# Patient Record
Sex: MALE | Race: WHITE | NOT HISPANIC OR LATINO | Employment: UNEMPLOYED | URBAN - METROPOLITAN AREA
[De-identification: names, ages, dates, MRNs, and addresses within clinical notes are randomized per-mention and may not be internally consistent; named-entity substitution may affect disease eponyms.]

---

## 2021-01-06 ENCOUNTER — TELEPHONE (OUTPATIENT)
Dept: SCHEDULING | Facility: IMAGING CENTER | Age: 61
End: 2021-01-06

## 2021-02-09 ENCOUNTER — TELEMEDICINE (OUTPATIENT)
Dept: MEDICAL GROUP | Facility: PHYSICIAN GROUP | Age: 61
End: 2021-02-09
Payer: COMMERCIAL

## 2021-02-09 VITALS — WEIGHT: 190 LBS | HEIGHT: 71 IN | BODY MASS INDEX: 26.6 KG/M2

## 2021-02-09 DIAGNOSIS — R53.83 FATIGUE, UNSPECIFIED TYPE: ICD-10-CM

## 2021-02-09 DIAGNOSIS — Z11.59 ENCOUNTER FOR HEPATITIS C SCREENING TEST FOR LOW RISK PATIENT: ICD-10-CM

## 2021-02-09 DIAGNOSIS — E78.5 DYSLIPIDEMIA: ICD-10-CM

## 2021-02-09 DIAGNOSIS — J45.20 MILD INTERMITTENT ASTHMA WITHOUT COMPLICATION: ICD-10-CM

## 2021-02-09 DIAGNOSIS — E11.9 CONTROLLED TYPE 2 DIABETES MELLITUS WITHOUT COMPLICATION, WITHOUT LONG-TERM CURRENT USE OF INSULIN (HCC): ICD-10-CM

## 2021-02-09 PROCEDURE — 99214 OFFICE O/P EST MOD 30 MIN: CPT | Performed by: FAMILY MEDICINE

## 2021-02-09 SDOH — HEALTH STABILITY: MENTAL HEALTH: HOW OFTEN DO YOU HAVE A DRINK CONTAINING ALCOHOL?: 2-3 TIMES A WEEK

## 2021-02-09 SDOH — HEALTH STABILITY: MENTAL HEALTH: HOW MANY STANDARD DRINKS CONTAINING ALCOHOL DO YOU HAVE ON A TYPICAL DAY?: 1 OR 2

## 2021-02-09 SDOH — HEALTH STABILITY: MENTAL HEALTH: HOW OFTEN DO YOU HAVE 6 OR MORE DRINKS ON ONE OCCASION?: NEVER

## 2021-02-09 ASSESSMENT — PATIENT HEALTH QUESTIONNAIRE - PHQ9: CLINICAL INTERPRETATION OF PHQ2 SCORE: 0

## 2021-02-10 NOTE — ASSESSMENT & PLAN NOTE
Continues with annual retinal exams with no retinopathy.   Pt states last A1c was about 7   He declines statin  Repeat labs ordered.

## 2021-02-10 NOTE — ASSESSMENT & PLAN NOTE
Well controlled, rare use of his inhaler. No refill needed currently.   It made a big difference when he started swimming 3 days a week.

## 2021-02-11 NOTE — PROGRESS NOTES
Virtual Visit: New Patient   This visit was conducted via Zoom using secure and encrypted videoconferencing technology. The patient was in a private location in the state of [place].    The patient's identity was confirmed and verbal consent was obtained for this virtual visit.    Subjective:     CC:   Chief Complaint   Patient presents with   • [facility]       Sanjana Canchola is a 60 y.o. male presenting to establish care and to discuss the evaluation and management of:    Mild intermittent asthma without complication  Well controlled, rare use of his inhaler. No refill needed currently.   It made a big difference when he started swimming 3 days a week.       Controlled type 2 diabetes mellitus without complication, without long-term current use of insulin (HCC)  Continues with annual retinal exams with no retinopathy.   Pt states last A1c was about 7   He declines statin  Repeat labs ordered.       ROS  See HPI  Constitutional: Negative for fever, chills and malaise/fatigue.   HENT: Negative for congestion.    Eyes: Negative for pain.   Respiratory: Negative for cough and shortness of breath.    Cardiovascular: Negative for leg swelling.   Gastrointestinal: Negative for nausea, vomiting, abdominal pain and diarrhea.   Genitourinary: Negative for dysuria and hematuria.   Skin: Negative for rash.   Neurological: Negative for dizziness, focal weakness and headaches.   Endo/Heme/Allergies: Does not bruise/bleed easily.   Psychiatric/Behavioral: Negative for depression.  The patient is not nervous/anxious.      Allergies   Allergen Reactions   • Clindamycin      swelling       Current medicines (including changes today)  Current Outpatient Medications   Medication Sig Dispense Refill   • WIXELA INHUB 250-50 MCG/DOSE AEROSOL POWDER, BREATH ACTIVATED 1 Puff every day.     • metFORMIN (GLUCOPHAGE) 500 MG Tab Take 500 mg by mouth 3 times a day.       No current facility-administered medications for this visit.       He   "has no past medical history on file.  He  has no past surgical history on file.      History reviewed. No pertinent family history.  No family status information on file.       Patient Active Problem List    Diagnosis Date Noted   • Mild intermittent asthma without complication 02/09/2021   • Controlled type 2 diabetes mellitus without complication, without long-term current use of insulin (HCC) 02/09/2021          Objective:   Ht 1.803 m (5' 11\")   Wt 86.2 kg (190 lb)   BMI 26.50 kg/m²     Physical Exam:  Constitutional: Alert, no distress, well-groomed.  Skin: No rashes in visible areas.  Eye: Round. Conjunctiva clear, lids normal. No icterus.   ENMT: Lips pink without lesions, good dentition, moist mucous membranes. Phonation normal.  Neck: No masses, no thyromegaly. Moves freely without pain.  Respiratory: Unlabored respiratory effort, no cough or audible wheeze  Psych: Alert and oriented x3, normal affect and mood.       Assessment and Plan:   The following treatment plan was discussed:     1. Mild intermittent asthma without complication    2. Controlled type 2 diabetes mellitus without complication, without long-term current use of insulin (HCC)  - Comp Metabolic Panel; Future  - HEMOGLOBIN A1C; Future  - CBC WITH DIFFERENTIAL; Future  - Lipid Profile; Future    3. Encounter for hepatitis C screening test for low risk patient  - HEP C VIRUS ANTIBODY; Future    4. Fatigue, unspecified type  - CBC WITH DIFFERENTIAL; Future    5. Dyslipidemia  - Comp Metabolic Panel; Future  - Lipid Profile; Future    Other orders  - WIXELA INHUB 250-50 MCG/DOSE AEROSOL POWDER, BREATH ACTIVATED; 1 Puff every day.  - metFORMIN (GLUCOPHAGE) 500 MG Tab; Take 500 mg by mouth 3 times a day.        Follow-up: Return in about 6 months (around 8/9/2021) for DM2, review labs.         "

## 2021-02-18 ENCOUNTER — HOSPITAL ENCOUNTER (OUTPATIENT)
Dept: LAB | Facility: MEDICAL CENTER | Age: 61
End: 2021-02-18
Attending: FAMILY MEDICINE
Payer: COMMERCIAL

## 2021-02-18 DIAGNOSIS — E78.5 DYSLIPIDEMIA: ICD-10-CM

## 2021-02-18 DIAGNOSIS — E11.9 CONTROLLED TYPE 2 DIABETES MELLITUS WITHOUT COMPLICATION, WITHOUT LONG-TERM CURRENT USE OF INSULIN (HCC): ICD-10-CM

## 2021-02-18 DIAGNOSIS — R53.83 FATIGUE, UNSPECIFIED TYPE: ICD-10-CM

## 2021-02-18 DIAGNOSIS — Z11.59 ENCOUNTER FOR HEPATITIS C SCREENING TEST FOR LOW RISK PATIENT: ICD-10-CM

## 2021-02-18 LAB
ALBUMIN SERPL BCP-MCNC: 4.4 G/DL (ref 3.2–4.9)
ALBUMIN/GLOB SERPL: 1.5 G/DL
ALP SERPL-CCNC: 90 U/L (ref 30–99)
ALT SERPL-CCNC: 13 U/L (ref 2–50)
ANION GAP SERPL CALC-SCNC: 9 MMOL/L (ref 7–16)
AST SERPL-CCNC: 12 U/L (ref 12–45)
BASOPHILS # BLD AUTO: 0.7 % (ref 0–1.8)
BASOPHILS # BLD: 0.07 K/UL (ref 0–0.12)
BILIRUB SERPL-MCNC: 0.3 MG/DL (ref 0.1–1.5)
BUN SERPL-MCNC: 14 MG/DL (ref 8–22)
CALCIUM SERPL-MCNC: 10.1 MG/DL (ref 8.5–10.5)
CHLORIDE SERPL-SCNC: 95 MMOL/L (ref 96–112)
CHOLEST SERPL-MCNC: 183 MG/DL (ref 100–199)
CO2 SERPL-SCNC: 28 MMOL/L (ref 20–33)
CREAT SERPL-MCNC: 0.88 MG/DL (ref 0.5–1.4)
EOSINOPHIL # BLD AUTO: 0.68 K/UL (ref 0–0.51)
EOSINOPHIL NFR BLD: 6.7 % (ref 0–6.9)
ERYTHROCYTE [DISTWIDTH] IN BLOOD BY AUTOMATED COUNT: 43.9 FL (ref 35.9–50)
EST. AVERAGE GLUCOSE BLD GHB EST-MCNC: 157 MG/DL
FASTING STATUS PATIENT QL REPORTED: NORMAL
GLOBULIN SER CALC-MCNC: 2.9 G/DL (ref 1.9–3.5)
GLUCOSE SERPL-MCNC: 124 MG/DL (ref 65–99)
HBA1C MFR BLD: 7.1 % (ref 0–5.6)
HCT VFR BLD AUTO: 45.7 % (ref 42–52)
HCV AB SER QL: NORMAL
HDLC SERPL-MCNC: 49 MG/DL
HGB BLD-MCNC: 15.1 G/DL (ref 14–18)
IMM GRANULOCYTES # BLD AUTO: 0.02 K/UL (ref 0–0.11)
IMM GRANULOCYTES NFR BLD AUTO: 0.2 % (ref 0–0.9)
LDLC SERPL CALC-MCNC: 111 MG/DL
LYMPHOCYTES # BLD AUTO: 2.2 K/UL (ref 1–4.8)
LYMPHOCYTES NFR BLD: 21.8 % (ref 22–41)
MCH RBC QN AUTO: 29.4 PG (ref 27–33)
MCHC RBC AUTO-ENTMCNC: 33 G/DL (ref 33.7–35.3)
MCV RBC AUTO: 88.9 FL (ref 81.4–97.8)
MONOCYTES # BLD AUTO: 0.9 K/UL (ref 0–0.85)
MONOCYTES NFR BLD AUTO: 8.9 % (ref 0–13.4)
NEUTROPHILS # BLD AUTO: 6.24 K/UL (ref 1.82–7.42)
NEUTROPHILS NFR BLD: 61.7 % (ref 44–72)
NRBC # BLD AUTO: 0 K/UL
NRBC BLD-RTO: 0 /100 WBC
PLATELET # BLD AUTO: 362 K/UL (ref 164–446)
PMV BLD AUTO: 9.7 FL (ref 9–12.9)
POTASSIUM SERPL-SCNC: 4.7 MMOL/L (ref 3.6–5.5)
PROT SERPL-MCNC: 7.3 G/DL (ref 6–8.2)
RBC # BLD AUTO: 5.14 M/UL (ref 4.7–6.1)
SODIUM SERPL-SCNC: 132 MMOL/L (ref 135–145)
TRIGL SERPL-MCNC: 114 MG/DL (ref 0–149)
WBC # BLD AUTO: 10.1 K/UL (ref 4.8–10.8)

## 2021-02-18 PROCEDURE — 36415 COLL VENOUS BLD VENIPUNCTURE: CPT

## 2021-02-18 PROCEDURE — 80061 LIPID PANEL: CPT

## 2021-02-18 PROCEDURE — 83036 HEMOGLOBIN GLYCOSYLATED A1C: CPT

## 2021-02-18 PROCEDURE — 86803 HEPATITIS C AB TEST: CPT

## 2021-02-18 PROCEDURE — 85025 COMPLETE CBC W/AUTO DIFF WBC: CPT

## 2021-02-18 PROCEDURE — 80053 COMPREHEN METABOLIC PANEL: CPT

## 2021-02-20 ENCOUNTER — PATIENT MESSAGE (OUTPATIENT)
Dept: MEDICAL GROUP | Facility: PHYSICIAN GROUP | Age: 61
End: 2021-02-20

## 2021-02-22 NOTE — TELEPHONE ENCOUNTER
From: Sanjana Canchola  To: Physician Susana Miller  Sent: 2/20/2021 7:12 AM PST  Subject: Test Result Question    Was there not supposed to be an A1C test this most recent time? Anything involving blood glucose levels?

## 2021-05-10 ENCOUNTER — PATIENT MESSAGE (OUTPATIENT)
Dept: MEDICAL GROUP | Facility: PHYSICIAN GROUP | Age: 61
End: 2021-05-10

## 2021-06-08 ENCOUNTER — TELEPHONE (OUTPATIENT)
Dept: MEDICAL GROUP | Facility: PHYSICIAN GROUP | Age: 61
End: 2021-06-08

## 2022-02-09 ENCOUNTER — OFFICE VISIT (OUTPATIENT)
Dept: MEDICAL GROUP | Facility: PHYSICIAN GROUP | Age: 62
End: 2022-02-09
Payer: COMMERCIAL

## 2022-02-09 VITALS
HEART RATE: 77 BPM | TEMPERATURE: 97.6 F | SYSTOLIC BLOOD PRESSURE: 130 MMHG | BODY MASS INDEX: 28.15 KG/M2 | RESPIRATION RATE: 16 BRPM | OXYGEN SATURATION: 97 % | WEIGHT: 207.8 LBS | DIASTOLIC BLOOD PRESSURE: 82 MMHG | HEIGHT: 72 IN

## 2022-02-09 DIAGNOSIS — M79.674 PAIN OF RIGHT GREAT TOE: ICD-10-CM

## 2022-02-09 DIAGNOSIS — Z23 NEED FOR VACCINATION: ICD-10-CM

## 2022-02-09 DIAGNOSIS — Z12.11 SCREENING FOR COLON CANCER: ICD-10-CM

## 2022-02-09 DIAGNOSIS — E11.9 CONTROLLED TYPE 2 DIABETES MELLITUS WITHOUT COMPLICATION, WITHOUT LONG-TERM CURRENT USE OF INSULIN (HCC): ICD-10-CM

## 2022-02-09 PROCEDURE — 99214 OFFICE O/P EST MOD 30 MIN: CPT | Mod: 25 | Performed by: FAMILY MEDICINE

## 2022-02-09 PROCEDURE — 90686 IIV4 VACC NO PRSV 0.5 ML IM: CPT | Performed by: FAMILY MEDICINE

## 2022-02-09 PROCEDURE — 90471 IMMUNIZATION ADMIN: CPT | Performed by: FAMILY MEDICINE

## 2022-02-09 ASSESSMENT — FIBROSIS 4 INDEX: FIB4 SCORE: 0.55

## 2022-02-09 ASSESSMENT — PATIENT HEALTH QUESTIONNAIRE - PHQ9: CLINICAL INTERPRETATION OF PHQ2 SCORE: 0

## 2022-02-10 ENCOUNTER — HOSPITAL ENCOUNTER (OUTPATIENT)
Dept: LAB | Facility: MEDICAL CENTER | Age: 62
End: 2022-02-10
Attending: FAMILY MEDICINE
Payer: COMMERCIAL

## 2022-02-10 DIAGNOSIS — E11.9 CONTROLLED TYPE 2 DIABETES MELLITUS WITHOUT COMPLICATION, WITHOUT LONG-TERM CURRENT USE OF INSULIN (HCC): ICD-10-CM

## 2022-02-10 LAB
ALBUMIN SERPL BCP-MCNC: 4.6 G/DL (ref 3.2–4.9)
ALBUMIN/GLOB SERPL: 1.6 G/DL
ALP SERPL-CCNC: 74 U/L (ref 30–99)
ALT SERPL-CCNC: 15 U/L (ref 2–50)
ANION GAP SERPL CALC-SCNC: 10 MMOL/L (ref 7–16)
AST SERPL-CCNC: 15 U/L (ref 12–45)
BILIRUB SERPL-MCNC: 0.5 MG/DL (ref 0.1–1.5)
BUN SERPL-MCNC: 15 MG/DL (ref 8–22)
CALCIUM SERPL-MCNC: 9.7 MG/DL (ref 8.5–10.5)
CHLORIDE SERPL-SCNC: 101 MMOL/L (ref 96–112)
CHOLEST SERPL-MCNC: 197 MG/DL (ref 100–199)
CO2 SERPL-SCNC: 26 MMOL/L (ref 20–33)
CREAT SERPL-MCNC: 0.91 MG/DL (ref 0.5–1.4)
CREAT UR-MCNC: 109.81 MG/DL
FASTING STATUS PATIENT QL REPORTED: NORMAL
GLOBULIN SER CALC-MCNC: 2.9 G/DL (ref 1.9–3.5)
GLUCOSE SERPL-MCNC: 117 MG/DL (ref 65–99)
HDLC SERPL-MCNC: 46 MG/DL
LDLC SERPL CALC-MCNC: 125 MG/DL
MICROALBUMIN UR-MCNC: <1.2 MG/DL
MICROALBUMIN/CREAT UR: NORMAL MG/G (ref 0–30)
POTASSIUM SERPL-SCNC: 4.7 MMOL/L (ref 3.6–5.5)
PROT SERPL-MCNC: 7.5 G/DL (ref 6–8.2)
SODIUM SERPL-SCNC: 137 MMOL/L (ref 135–145)
TRIGL SERPL-MCNC: 128 MG/DL (ref 0–149)

## 2022-02-10 PROCEDURE — 83036 HEMOGLOBIN GLYCOSYLATED A1C: CPT

## 2022-02-10 PROCEDURE — 82570 ASSAY OF URINE CREATININE: CPT

## 2022-02-10 PROCEDURE — 82043 UR ALBUMIN QUANTITATIVE: CPT

## 2022-02-10 PROCEDURE — 36415 COLL VENOUS BLD VENIPUNCTURE: CPT

## 2022-02-10 PROCEDURE — 80053 COMPREHEN METABOLIC PANEL: CPT

## 2022-02-10 PROCEDURE — 80061 LIPID PANEL: CPT

## 2022-02-10 NOTE — ASSESSMENT & PLAN NOTE
Redness, pain, swelling of right great toe very severe 3 days ago.   Patient notes improvement of pain and swelling after epsom salt soaks, he has a healing blister on the bottom of his toe.   Advised he should contact me in a week if not resolved for antibiotic course.     No pain of the joints of the house and no significant pain to indicate gout.   He has hx of a DVT of R LE.   Normal cap refill, no swelling or pain of calf.   He is on low dose asa 81 mg x 2 days.

## 2022-02-10 NOTE — PROGRESS NOTES
Subjective:   Dylan Canchola is a 61 y.o. male here today for evaluation and management of:     Pain of right great toe  Redness, pain, swelling of right great toe very severe 3 days ago.   Patient notes improvement of pain and swelling after epsom salt soaks, he has a healing blister on the bottom of his toe.   Advised he should contact me in a week if not resolved for antibiotic course.     No pain of the joints of the house and no significant pain to indicate gout.   He has hx of a DVT of R LE.   Normal cap refill, no swelling or pain of calf.   He is on low dose asa 81 mg x 2 days.     Controlled type 2 diabetes mellitus without complication, without long-term current use of insulin ([facility])  A1c:   Lab Results   Component Value Date/Time    HBA1C 7.1 (H) 02/18/2021 0614    AVGLUC 157 02/18/2021 0614     Lipids:   Lab Results   Component Value Date/Time    CHOLSTRLTOT 183 02/18/2021 06:14 AM    TRIGLYCERIDE 114 02/18/2021 06:14 AM    HDL 49 02/18/2021 06:14 AM     (H) 02/18/2021 06:14 AM   ]  BMP:   Lab Results   Component Value Date/Time    SODIUM 132 (L) 02/18/2021 0614    POTASSIUM 4.7 02/18/2021 0614    CHLORIDE 95 (L) 02/18/2021 0614    CO2 28 02/18/2021 0614    GLUCOSE 124 (H) 02/18/2021 0614    BUN 14 02/18/2021 0614    CREATININE 0.88 02/18/2021 0614    CALCIUM 10.1 02/18/2021 0614    ANION 9.0 02/18/2021 0614     GFR:   Lab Results   Component Value Date/Time    IF[place] >60 02/18/2021 0614    IFNOTAFR >60 02/18/2021 0614     Last eye exam: was last month.   Foot exam: normal sensation, b/l, he has a blister and redness of right great toe.   Medications: metformin 500mg BID           Current medicines (including changes today)  Current Outpatient Medications   Medication Sig Dispense Refill   • aspirin EC (ECOTRIN) 81 MG Tablet Delayed Response Take 81 mg by mouth every day.     • Zoster Vac Recomb Adjuvanted (SHINGRIX) 50 MCG/0.5ML Recon Susp Inject 0.5 mL into the shoulder, thigh, or buttocks one  time for 1 dose. 0.5 mL 0   • metFORMIN (GLUCOPHAGE) 500 MG Tab Take 1 tablet by mouth 3 times a day. 270 tablet 3   • WIXELA INHUB 250-50 MCG/DOSE AEROSOL POWDER, BREATH ACTIVATED 1 Puff every day.       No current facility-administered medications for this visit.     He  has no past medical history on file.    ROS  No chest pain, no shortness of breath, no abdominal pain       Objective:     /82   Pulse 77   Temp 36.4 °C (97.6 °F) (Temporal)   Resp 16   Ht 1.829 m (6')   Wt 94.3 kg (207 lb 12.8 oz)   SpO2 97%  Body mass index is 28.18 kg/m².   Physical Exam:  Constitutional: Alert, no distress.  Skin: Warm, dry, good turgor, no rashes in visible areas.  Eye: Equal, round and reactive, conjunctiva clear, lids normal.  ENMT: Lips without lesions, good dentition, oropharynx clear.  Neck: Trachea midline, no masses, no thyromegaly. No cervical or supraclavicular lymphadenopathy  Respiratory: Unlabored respiratory effort, lungs clear to auscultation, no wheezes, no ronchi.  Cardiovascular: Normal S1, S2, no murmur, no edema.  Abdomen: Soft, non-tender, no masses, no hepatosplenomegaly.  Psych: Alert and oriented x3, normal affect and mood.        Assessment and Plan:   The following treatment plan was discussed    1. Need for vaccination    - INFLUENZA VACCINE QUAD INJ (PF)  - Zoster Vac Recomb Adjuvanted (SHINGRIX) 50 MCG/0.5ML Recon Susp; Inject 0.5 mL into the shoulder, thigh, or buttocks one time for 1 dose.  Dispense: 0.5 mL; Refill: 0    2. Controlled type 2 diabetes mellitus without complication, without long-term current use of insulin (HCC)    - Microalbumin Creat Ratio Urine (Lab Collect); Future  - Hemoglobin A1C; Future  - Lipid Profile; Future  - Comp Metabolic Panel; Future    3. Screening for colon cancer    - Referral to GI for Colonoscopy    4. Pain of right great toe  Blanching redness around great toe improving.   Continue with epsom soaks  If not resolved in a week, prescribe  antibiotics.       Followup: Return in about 6 months (around 8/9/2022) for DM2, foot exam.

## 2022-02-10 NOTE — ASSESSMENT & PLAN NOTE
A1c:   Lab Results   Component Value Date/Time    HBA1C 7.1 (H) 02/18/2021 0614    AVGLUC 157 02/18/2021 0614     Lipids:   Lab Results   Component Value Date/Time    CHOLSTRLTOT 183 02/18/2021 06:14 AM    TRIGLYCERIDE 114 02/18/2021 06:14 AM    HDL 49 02/18/2021 06:14 AM     (H) 02/18/2021 06:14 AM   ]  BMP:   Lab Results   Component Value Date/Time    SODIUM 132 (L) 02/18/2021 0614    POTASSIUM 4.7 02/18/2021 0614    CHLORIDE 95 (L) 02/18/2021 0614    CO2 28 02/18/2021 0614    GLUCOSE 124 (H) 02/18/2021 0614    BUN 14 02/18/2021 0614    CREATININE 0.88 02/18/2021 0614    CALCIUM 10.1 02/18/2021 0614    ANION 9.0 02/18/2021 0614     GFR:   Lab Results   Component Value Date/Time    IF[place] >60 02/18/2021 0614    IFNOTAFR >60 02/18/2021 0614     Last eye exam: was last month.   Foot exam: normal sensation, b/l, he has a blister and redness of right great toe.   Medications: metformin 500mg BID

## 2022-02-11 LAB
EST. AVERAGE GLUCOSE BLD GHB EST-MCNC: 146 MG/DL
HBA1C MFR BLD: 6.7 % (ref 4–5.6)

## 2022-02-23 RX ORDER — SIMVASTATIN 10 MG
10 TABLET ORAL NIGHTLY
Qty: 90 TABLET | Refills: 3 | Status: SHIPPED | OUTPATIENT
Start: 2022-02-23 | End: 2024-03-20

## 2022-02-23 NOTE — RESULT ENCOUNTER NOTE
Released to joon Richardson,  Your A1c, sodium and blood sugar levels have improved! Kidney function is good! Your LDL cholesterol has gone up which should be lower. Its above 120 and the target is 70. I've sent a prescription for simvastatin 10 mg  to [facility], which lowers cholesterol and lowers the risk of heart attacks and strokes. It may cause muscle pain if it does, try once or twice a week to see if that helps if not stop the medication. You can also wait to talk to me if you would like before you start the medication.   See you in August!    Susana Miller M.D.

## 2022-07-25 NOTE — TELEPHONE ENCOUNTER
Received request via: Pharmacy    Was the patient seen in the last year in this department? Yes    Does the patient have an active prescription (recently filled or refills available) for medication(s) requested? No     Last OV: 2/9/2022

## 2022-08-12 ENCOUNTER — HOSPITAL ENCOUNTER (OUTPATIENT)
Dept: LAB | Facility: MEDICAL CENTER | Age: 62
End: 2022-08-12
Attending: FAMILY MEDICINE
Payer: COMMERCIAL

## 2022-08-12 ENCOUNTER — OFFICE VISIT (OUTPATIENT)
Dept: MEDICAL GROUP | Facility: PHYSICIAN GROUP | Age: 62
End: 2022-08-12
Payer: COMMERCIAL

## 2022-08-12 VITALS
SYSTOLIC BLOOD PRESSURE: 118 MMHG | BODY MASS INDEX: 27.58 KG/M2 | DIASTOLIC BLOOD PRESSURE: 86 MMHG | TEMPERATURE: 97.2 F | HEIGHT: 71 IN | OXYGEN SATURATION: 97 % | RESPIRATION RATE: 14 BRPM | WEIGHT: 197 LBS | HEART RATE: 65 BPM

## 2022-08-12 DIAGNOSIS — M79.604 RIGHT LEG PAIN: ICD-10-CM

## 2022-08-12 DIAGNOSIS — E11.9 CONTROLLED TYPE 2 DIABETES MELLITUS WITHOUT COMPLICATION, WITHOUT LONG-TERM CURRENT USE OF INSULIN (HCC): ICD-10-CM

## 2022-08-12 DIAGNOSIS — Z23 NEED FOR VACCINATION: ICD-10-CM

## 2022-08-12 DIAGNOSIS — J45.20 MILD INTERMITTENT ASTHMA WITHOUT COMPLICATION: ICD-10-CM

## 2022-08-12 LAB
BASOPHILS # BLD AUTO: 0.7 % (ref 0–1.8)
BASOPHILS # BLD: 0.05 K/UL (ref 0–0.12)
D DIMER PPP IA.FEU-MCNC: 0.31 UG/ML (FEU) (ref 0–0.5)
EOSINOPHIL # BLD AUTO: 0.48 K/UL (ref 0–0.51)
EOSINOPHIL NFR BLD: 6.9 % (ref 0–6.9)
ERYTHROCYTE [DISTWIDTH] IN BLOOD BY AUTOMATED COUNT: 42.8 FL (ref 35.9–50)
HCT VFR BLD AUTO: 46.8 % (ref 42–52)
HGB BLD-MCNC: 15.8 G/DL (ref 14–18)
IMM GRANULOCYTES # BLD AUTO: 0.02 K/UL (ref 0–0.11)
IMM GRANULOCYTES NFR BLD AUTO: 0.3 % (ref 0–0.9)
LYMPHOCYTES # BLD AUTO: 2.18 K/UL (ref 1–4.8)
LYMPHOCYTES NFR BLD: 31.3 % (ref 22–41)
MCH RBC QN AUTO: 29.6 PG (ref 27–33)
MCHC RBC AUTO-ENTMCNC: 33.8 G/DL (ref 33.7–35.3)
MCV RBC AUTO: 87.8 FL (ref 81.4–97.8)
MONOCYTES # BLD AUTO: 0.66 K/UL (ref 0–0.85)
MONOCYTES NFR BLD AUTO: 9.5 % (ref 0–13.4)
NEUTROPHILS # BLD AUTO: 3.58 K/UL (ref 1.82–7.42)
NEUTROPHILS NFR BLD: 51.3 % (ref 44–72)
NRBC # BLD AUTO: 0 K/UL
NRBC BLD-RTO: 0 /100 WBC
PLATELET # BLD AUTO: 341 K/UL (ref 164–446)
PMV BLD AUTO: 9.4 FL (ref 9–12.9)
RBC # BLD AUTO: 5.33 M/UL (ref 4.7–6.1)
WBC # BLD AUTO: 7 K/UL (ref 4.8–10.8)

## 2022-08-12 PROCEDURE — 36415 COLL VENOUS BLD VENIPUNCTURE: CPT

## 2022-08-12 PROCEDURE — 83036 HEMOGLOBIN GLYCOSYLATED A1C: CPT

## 2022-08-12 PROCEDURE — 80053 COMPREHEN METABOLIC PANEL: CPT

## 2022-08-12 PROCEDURE — 99214 OFFICE O/P EST MOD 30 MIN: CPT | Performed by: FAMILY MEDICINE

## 2022-08-12 PROCEDURE — 85379 FIBRIN DEGRADATION QUANT: CPT

## 2022-08-12 PROCEDURE — 85025 COMPLETE CBC W/AUTO DIFF WBC: CPT

## 2022-08-12 RX ORDER — FLUTICASONE PROPIONATE AND SALMETEROL 250; 50 UG/1; UG/1
1 POWDER RESPIRATORY (INHALATION) EVERY 12 HOURS
Qty: 60 EACH | Refills: 11 | Status: SHIPPED | OUTPATIENT
Start: 2022-08-12 | End: 2023-08-15

## 2022-08-12 ASSESSMENT — FIBROSIS 4 INDEX: FIB4 SCORE: 0.65

## 2022-08-12 NOTE — PROGRESS NOTES
Subjective:   Dylan Canchola is a 62 y.o. male here today for evaluation and management of:     Controlled type 2 diabetes mellitus without complication, without long-term current use of insulin ([facility])  A1c:   Lab Results   Component Value Date/Time    HBA1C 6.7 (H) 02/10/2022 0722    AVGLUC 146 02/10/2022 0722     Lipids:   Lab Results   Component Value Date/Time    CHOLSTRLTOT 197 02/10/2022 07:22 AM    TRIGLYCERIDE 128 02/10/2022 07:22 AM    HDL 46 02/10/2022 07:22 AM     (H) 02/10/2022 07:22 AM   ]  BMP:   Lab Results   Component Value Date/Time    SODIUM 137 02/10/2022 0722    POTASSIUM 4.7 02/10/2022 0722    CHLORIDE 101 02/10/2022 0722    CO2 26 02/10/2022 0722    GLUCOSE 117 (H) 02/10/2022 0722    BUN 15 02/10/2022 0722    CREATININE 0.91 02/10/2022 0722    CALCIUM 9.7 02/10/2022 0722    ANION 10.0 02/10/2022 0722     GFR:   Lab Results   Component Value Date/Time    IF[place] >60 02/10/2022 0722    IFNOTAFR >60 02/10/2022 0722     Last eye exam: up to date  Foot exam: normal sensation to monofilament testing b/l. He has right big toe callus and band aid over right toe nail when he hurt his toe yesterday.   Medications: met, simvastatin, asa      Mild intermittent asthma without complication  Some days he has cough and night time symptoms. He notes when he is swimming regularly he has less   Is on wixella. Refill provided.     Right leg pain  Hx of right calf DVT over a year ago approx 2021.   He now with movement like walking mainly he has an ache over the front of his knee   He has no swelling, redness or pain of calf.   He has no pain at rest.   Will check dimer, if elevated will check US         Current medicines (including changes today)  Current Outpatient Medications   Medication Sig Dispense Refill    Zoster Vac Recomb Adjuvanted (SHINGRIX) 50 MCG/0.5ML Recon Susp Inject 0.5 mL into the shoulder, thigh, or buttocks one time for 1 dose. 0.5 mL 0    fluticasone-salmeterol (ADVAIR) 250-50 MCG/ACT  "AEROSOL POWDER, BREATH ACTIVATED Inhale 1 Puff every 12 hours. 60 Each 11    metFORMIN (GLUCOPHAGE) 500 MG Tab Take 1 Tablet by mouth 3 times a day. 270 Tablet 3    simvastatin (ZOCOR) 10 MG Tab Take 1 Tablet by mouth every evening. For high cholesterol 90 Tablet 3    aspirin EC (ECOTRIN) 81 MG Tablet Delayed Response Take 81 mg by mouth every day.      WIXELA INHUB 250-50 MCG/DOSE AEROSOL POWDER, BREATH ACTIVATED 1 Puff every day.       No current facility-administered medications for this visit.     He  has no past medical history on file.    ROS  No chest pain, no shortness of breath, no abdominal pain       Objective:     /86 (BP Location: Left arm, Patient Position: Sitting, BP Cuff Size: Small adult)   Pulse 65   Temp 36.2 °C (97.2 °F) (Temporal)   Resp 14   Ht 1.803 m (5' 11\")   Wt 89.4 kg (197 lb)   SpO2 97%  Body mass index is 27.48 kg/m².   Physical Exam:  Constitutional: Alert, no distress.  Skin: Warm, dry, good turgor, no rashes in visible areas.  Eye: Equal, round and reactive, conjunctiva clear, lids normal.  ENMT: Lips without lesions, good dentition, oropharynx clear.  Neck: Trachea midline, no masses, no thyromegaly. No cervical or supraclavicular lymphadenopathy  Respiratory: Unlabored respiratory effort, lungs clear to auscultation, no wheezes, no ronchi.  Cardiovascular: Normal S1, S2, no murmur, no edema.  Abdomen: Soft, non-tender, no masses, no hepatosplenomegaly.  Psych: Alert and oriented x3, normal affect and mood.        Assessment and Plan:   The following treatment plan was discussed    1. Controlled type 2 diabetes mellitus without complication, without long-term current use of insulin (HCC)  - Hemoglobin A1C; Future  - Diabetic Monofilament LE Exam  - HEMOGLOBIN A1C; Future  - Comp Metabolic Panel; Future    2. Need for vaccination  - Zoster Vac Recomb Adjuvanted (SHINGRIX) 50 MCG/0.5ML Recon Susp; Inject 0.5 mL into the shoulder, thigh, or buttocks one time for 1 dose.  " Dispense: 0.5 mL; Refill: 0    3. Mild intermittent asthma without complication    4. Right leg pain  - D-DIMER; Future  - CBC WITH DIFFERENTIAL; Future    Other orders  - fluticasone-salmeterol (ADVAIR) 250-50 MCG/ACT AEROSOL POWDER, BREATH ACTIVATED; Inhale 1 Puff every 12 hours.  Dispense: 60 Each; Refill: 11      Followup: Return in about 6 months (around 2/12/2023) for fasting labs today. Advised to get colonoscopy done.

## 2022-08-12 NOTE — ASSESSMENT & PLAN NOTE
Some days he has cough and night time symptoms. He notes when he is swimming regularly he has less   Is on wixella. Refill provided.

## 2022-08-12 NOTE — ASSESSMENT & PLAN NOTE
Hx of right calf DVT over a year ago approx 2021.   He now with movement like walking mainly he has an ache over the front of his knee   He has no swelling, redness or pain of calf.   He has no pain at rest.   Will check dimer, if elevated will check US

## 2022-08-12 NOTE — PATIENT INSTRUCTIONS
Please call to schedule your colonoscopy   Referral information sent to the following:  Gastroenterology      [facility]   [address] [place] [id number]  Phone: [phone]   Fax: [phone]

## 2022-08-12 NOTE — ASSESSMENT & PLAN NOTE
A1c:   Lab Results   Component Value Date/Time    HBA1C 6.7 (H) 02/10/2022 0722    AVGLUC 146 02/10/2022 0722     Lipids:   Lab Results   Component Value Date/Time    CHOLSTRLTOT 197 02/10/2022 07:22 AM    TRIGLYCERIDE 128 02/10/2022 07:22 AM    HDL 46 02/10/2022 07:22 AM     (H) 02/10/2022 07:22 AM   ]  BMP:   Lab Results   Component Value Date/Time    SODIUM 137 02/10/2022 0722    POTASSIUM 4.7 02/10/2022 0722    CHLORIDE 101 02/10/2022 0722    CO2 26 02/10/2022 0722    GLUCOSE 117 (H) 02/10/2022 0722    BUN 15 02/10/2022 0722    CREATININE 0.91 02/10/2022 0722    CALCIUM 9.7 02/10/2022 0722    ANION 10.0 02/10/2022 0722     GFR:   Lab Results   Component Value Date/Time    IF[place] >60 02/10/2022 0722    IFNOTAFR >60 02/10/2022 0722     Last eye exam: up to date  Foot exam: normal sensation to monofilament testing b/l. He has right big toe callus and band aid over right toe nail when he hurt his toe yesterday.   Medications: met, simvastatin, asa

## 2022-08-13 LAB
ALBUMIN SERPL BCP-MCNC: 4.7 G/DL (ref 3.2–4.9)
ALBUMIN/GLOB SERPL: 1.8 G/DL
ALP SERPL-CCNC: 73 U/L (ref 30–99)
ALT SERPL-CCNC: 11 U/L (ref 2–50)
ANION GAP SERPL CALC-SCNC: 11 MMOL/L (ref 7–16)
AST SERPL-CCNC: 15 U/L (ref 12–45)
BILIRUB SERPL-MCNC: 0.6 MG/DL (ref 0.1–1.5)
BUN SERPL-MCNC: 13 MG/DL (ref 8–22)
CALCIUM SERPL-MCNC: 9.2 MG/DL (ref 8.5–10.5)
CHLORIDE SERPL-SCNC: 100 MMOL/L (ref 96–112)
CO2 SERPL-SCNC: 25 MMOL/L (ref 20–33)
CREAT SERPL-MCNC: 0.9 MG/DL (ref 0.5–1.4)
EST. AVERAGE GLUCOSE BLD GHB EST-MCNC: 137 MG/DL
GFR SERPLBLD CREATININE-BSD FMLA CKD-EPI: 97 ML/MIN/1.73 M 2
GLOBULIN SER CALC-MCNC: 2.6 G/DL (ref 1.9–3.5)
GLUCOSE SERPL-MCNC: 107 MG/DL (ref 65–99)
HBA1C MFR BLD: 6.4 % (ref 4–5.6)
POTASSIUM SERPL-SCNC: 4.5 MMOL/L (ref 3.6–5.5)
PROT SERPL-MCNC: 7.3 G/DL (ref 6–8.2)
SODIUM SERPL-SCNC: 136 MMOL/L (ref 135–145)

## 2023-02-14 ENCOUNTER — OFFICE VISIT (OUTPATIENT)
Dept: MEDICAL GROUP | Facility: PHYSICIAN GROUP | Age: 63
End: 2023-02-14
Payer: COMMERCIAL

## 2023-02-14 ENCOUNTER — HOSPITAL ENCOUNTER (OUTPATIENT)
Dept: LAB | Facility: MEDICAL CENTER | Age: 63
End: 2023-02-14
Attending: FAMILY MEDICINE
Payer: COMMERCIAL

## 2023-02-14 VITALS
TEMPERATURE: 97.8 F | RESPIRATION RATE: 14 BRPM | SYSTOLIC BLOOD PRESSURE: 118 MMHG | BODY MASS INDEX: 27.09 KG/M2 | DIASTOLIC BLOOD PRESSURE: 80 MMHG | WEIGHT: 200 LBS | HEIGHT: 72 IN | OXYGEN SATURATION: 98 % | HEART RATE: 78 BPM

## 2023-02-14 DIAGNOSIS — E11.9 CONTROLLED TYPE 2 DIABETES MELLITUS WITHOUT COMPLICATION, WITHOUT LONG-TERM CURRENT USE OF INSULIN (HCC): ICD-10-CM

## 2023-02-14 DIAGNOSIS — Z23 NEED FOR VACCINATION: ICD-10-CM

## 2023-02-14 DIAGNOSIS — Z12.12 SCREENING FOR COLORECTAL CANCER: ICD-10-CM

## 2023-02-14 DIAGNOSIS — Z12.11 SCREENING FOR COLORECTAL CANCER: ICD-10-CM

## 2023-02-14 DIAGNOSIS — Z12.5 SCREENING FOR MALIGNANT NEOPLASM OF PROSTATE: ICD-10-CM

## 2023-02-14 DIAGNOSIS — H61.23 BILATERAL IMPACTED CERUMEN: ICD-10-CM

## 2023-02-14 LAB
CHOLEST SERPL-MCNC: 193 MG/DL (ref 100–199)
CREAT UR-MCNC: 106.43 MG/DL
FASTING STATUS PATIENT QL REPORTED: NORMAL
HDLC SERPL-MCNC: 51 MG/DL
LDLC SERPL CALC-MCNC: 111 MG/DL
MICROALBUMIN UR-MCNC: <1.2 MG/DL
MICROALBUMIN/CREAT UR: NORMAL MG/G (ref 0–30)
PSA SERPL-MCNC: 6.15 NG/ML (ref 0–4)
TRIGL SERPL-MCNC: 153 MG/DL (ref 0–149)

## 2023-02-14 PROCEDURE — 90686 IIV4 VACC NO PRSV 0.5 ML IM: CPT | Performed by: FAMILY MEDICINE

## 2023-02-14 PROCEDURE — 69210 REMOVE IMPACTED EAR WAX UNI: CPT | Mod: 50 | Performed by: FAMILY MEDICINE

## 2023-02-14 PROCEDURE — 83036 HEMOGLOBIN GLYCOSYLATED A1C: CPT

## 2023-02-14 PROCEDURE — 82043 UR ALBUMIN QUANTITATIVE: CPT

## 2023-02-14 PROCEDURE — 99214 OFFICE O/P EST MOD 30 MIN: CPT | Mod: 25 | Performed by: FAMILY MEDICINE

## 2023-02-14 PROCEDURE — 90472 IMMUNIZATION ADMIN EACH ADD: CPT | Performed by: FAMILY MEDICINE

## 2023-02-14 PROCEDURE — 84153 ASSAY OF PSA TOTAL: CPT

## 2023-02-14 PROCEDURE — 80061 LIPID PANEL: CPT

## 2023-02-14 PROCEDURE — 90677 PCV20 VACCINE IM: CPT | Performed by: FAMILY MEDICINE

## 2023-02-14 PROCEDURE — 90471 IMMUNIZATION ADMIN: CPT | Performed by: FAMILY MEDICINE

## 2023-02-14 PROCEDURE — 36415 COLL VENOUS BLD VENIPUNCTURE: CPT

## 2023-02-14 PROCEDURE — 82570 ASSAY OF URINE CREATININE: CPT

## 2023-02-14 RX ORDER — CLOSTRIDIUM TETANI TOXOID ANTIGEN (FORMALDEHYDE INACTIVATED), CORYNEBACTERIUM DIPHTHERIAE TOXOID ANTIGEN (FORMALDEHYDE INACTIVATED), BORDETELLA PERTUSSIS TOXOID ANTIGEN (GLUTARALDEHYDE INACTIVATED), BORDETELLA PERTUSSIS FILAMENTOUS HEMAGGLUTININ ANTIGEN (FORMALDEHYDE INACTIVATED), BORDETELLA PERTUSSIS PERTACTIN ANTIGEN, AND BORDETELLA PERTUSSIS FIMBRIAE 2/3 ANTIGEN 5; 2; 2.5; 5; 3; 5 [LF]/.5ML; [LF]/.5ML; UG/.5ML; UG/.5ML; UG/.5ML; UG/.5ML
0.5 INJECTION, SUSPENSION INTRAMUSCULAR ONCE
Qty: 0.5 ML | Refills: 0 | Status: SHIPPED
Start: 2023-02-14 | End: 2023-02-14

## 2023-02-14 ASSESSMENT — PATIENT HEALTH QUESTIONNAIRE - PHQ9: CLINICAL INTERPRETATION OF PHQ2 SCORE: 0

## 2023-02-14 ASSESSMENT — FIBROSIS 4 INDEX: FIB4 SCORE: 0.81

## 2023-02-14 NOTE — PROGRESS NOTES
Subjective:   Dylan Canchola is a 62 y.o. male here today for evaluation and management of:     Controlled type 2 diabetes mellitus without complication, without long-term current use of insulin (HCC)  Well controlled he is on met 500mg TID  Simvastatin, asa  A1c:   Lab Results   Component Value Date/Time    HBA1C 6.4 (H) 08/12/2022 1101    AVGLUC 137 08/12/2022 1101     Lipids:   Lab Results   Component Value Date/Time    CHOLSTRLTOT 197 02/10/2022 07:22 AM    TRIGLYCERIDE 128 02/10/2022 07:22 AM    HDL 46 02/10/2022 07:22 AM     (H) 02/10/2022 07:22 AM   ]  BMP:   Lab Results   Component Value Date/Time    SODIUM 136 08/12/2022 1101    POTASSIUM 4.5 08/12/2022 1101    CHLORIDE 100 08/12/2022 1101    CO2 25 08/12/2022 1101    GLUCOSE 107 (H) 08/12/2022 1101    BUN 13 08/12/2022 1101    CREATININE 0.90 08/12/2022 1101    CALCIUM 9.2 08/12/2022 1101    ANION 11.0 08/12/2022 1101     GFR:   Lab Results   Component Value Date/Time    IF[place] >60 02/10/2022 0722    IFNOTAFR >60 02/10/2022 0722     Last eye exam: up to date. Advised to get annual retinal screening done.   Foot exam: up to date, has no ulcers or foot deformities.   Repeat fasting labs ordered.       Bilateral impacted cerumen  Chronic condition,   On exam b/l ears with impacted cerumen  currete used to remove small amount of ear wax  Patient advised to use debrox ear softening drops.   Lavage next visit if persistent cerumen impaction.              Current medicines (including changes today)  Current Outpatient Medications   Medication Sig Dispense Refill    tetanus-dipth-acell pertussis (ADACEL) 5-2-15.5 LF-MCG/0.5 Suspension Inject 0.5 mL into the shoulder, thigh, or buttocks one time for 1 dose. 0.5 mL 0    Zoster Vac Recomb Adjuvanted (SHINGRIX) 50 MCG/0.5ML Recon Susp Inject 0.5 mL into the shoulder, thigh, or buttocks one time for 1 dose. 0.5 mL 0    fluticasone-salmeterol (ADVAIR) 250-50 MCG/ACT AEROSOL POWDER, BREATH ACTIVATED Inhale 1 Puff  "every 12 hours. 60 Each 11    metFORMIN (GLUCOPHAGE) 500 MG Tab Take 1 Tablet by mouth 3 times a day. 270 Tablet 3    simvastatin (ZOCOR) 10 MG Tab Take 1 Tablet by mouth every evening. For high cholesterol 90 Tablet 3    aspirin EC (ECOTRIN) 81 MG Tablet Delayed Response Take 81 mg by mouth every day.      WIXELA INHUB 250-50 MCG/DOSE AEROSOL POWDER, BREATH ACTIVATED 1 Puff every day.       No current facility-administered medications for this visit.     He  has no past medical history on file.    ROS  No chest pain, no shortness of breath, no abdominal pain       Objective:     /80 (BP Location: Left arm, Patient Position: Sitting, BP Cuff Size: Adult)   Pulse 78   Temp 36.6 °C (97.8 °F) (Temporal)   Resp 14   Ht 1.822 m (5' 11.75\")   Wt 90.7 kg (200 lb)   SpO2 98%  Body mass index is 27.31 kg/m².   Physical Exam:  Constitutional: Alert, no distress.  Skin: Warm, dry, good turgor, no rashes in visible areas.  Eye: Equal, round and reactive, conjunctiva clear, lids normal.  ENMT: Lips without lesions, good dentition, oropharynx clear.  Neck: Trachea midline, no masses, no thyromegaly. No cervical or supraclavicular lymphadenopathy  Respiratory: Unlabored respiratory effort, lungs clear to auscultation, no wheezes, no ronchi.  Cardiovascular: Normal S1, S2, no murmur, no edema.  Abdomen: Soft, non-tender, no masses, no hepatosplenomegaly.  Psych: Alert and oriented x3, normal affect and mood.        Assessment and Plan:   The following treatment plan was discussed    1. Need for vaccination  - tetanus-dipth-acell pertussis (ADACEL) 5-2-15.5 LF-MCG/0.5 Suspension; Inject 0.5 mL into the shoulder, thigh, or buttocks one time for 1 dose.  Dispense: 0.5 mL; Refill: 0  - Zoster Vac Recomb Adjuvanted (SHINGRIX) 50 MCG/0.5ML Recon Susp; Inject 0.5 mL into the shoulder, thigh, or buttocks one time for 1 dose.  Dispense: 0.5 mL; Refill: 0  - INFLUENZA VACCINE QUAD INJ (PF)  - Pneumococcal Conjugate Vaccine " 20-Valent (19 yrs+)    2. Screening for colorectal cancer  - Referral to GI for Colonoscopy    3. Controlled type 2 diabetes mellitus without complication, without long-term current use of insulin (HCC)  - Referral for Retinal Screening Exam; Future  - Microalbumin Creat Ratio Urine (Lab Collect); Future  - Hemoglobin A1C; Future  - Lipid Profile; Future    4. Bilateral impacted cerumen    5. Screening for malignant neoplasm of prostate  - PROSTATE SPECIFIC AG SCREENING; Future      Followup: Return in about 6 months (around 8/14/2023).

## 2023-02-14 NOTE — ASSESSMENT & PLAN NOTE
Well controlled he is on met 500mg TID  Simvastatin, asa  A1c:   Lab Results   Component Value Date/Time    HBA1C 6.4 (H) 08/12/2022 1101    AVGLUC 137 08/12/2022 1101     Lipids:   Lab Results   Component Value Date/Time    CHOLSTRLTOT 197 02/10/2022 07:22 AM    TRIGLYCERIDE 128 02/10/2022 07:22 AM    HDL 46 02/10/2022 07:22 AM     (H) 02/10/2022 07:22 AM   ]  BMP:   Lab Results   Component Value Date/Time    SODIUM 136 08/12/2022 1101    POTASSIUM 4.5 08/12/2022 1101    CHLORIDE 100 08/12/2022 1101    CO2 25 08/12/2022 1101    GLUCOSE 107 (H) 08/12/2022 1101    BUN 13 08/12/2022 1101    CREATININE 0.90 08/12/2022 1101    CALCIUM 9.2 08/12/2022 1101    ANION 11.0 08/12/2022 1101     GFR:   Lab Results   Component Value Date/Time    IF[place] >60 02/10/2022 0722    IFNOTAFR >60 02/10/2022 0722     Last eye exam: up to date. Advised to get annual retinal screening done.   Foot exam: up to date, has no ulcers or foot deformities.   Repeat fasting labs ordered.

## 2023-02-16 LAB
EST. AVERAGE GLUCOSE BLD GHB EST-MCNC: 143 MG/DL
HBA1C MFR BLD: 6.6 % (ref 4–5.6)

## 2023-02-17 DIAGNOSIS — R97.20 ELEVATED PSA, LESS THAN 10 NG/ML: ICD-10-CM

## 2023-07-31 NOTE — TELEPHONE ENCOUNTER
Received request via: Pharmacy    Was the patient seen in the last year in this department? Yes    Does the patient have an active prescription (recently filled or refills available) for medication(s) requested? No    Does the patient have [facility] and need 100 day supply (blood pressure, diabetes and cholesterol meds only)? Patient does not have SCP      Last Office Visit:02/14/2023  Last Labs:02/14/2023

## 2023-08-15 ENCOUNTER — OFFICE VISIT (OUTPATIENT)
Dept: MEDICAL GROUP | Facility: PHYSICIAN GROUP | Age: 63
End: 2023-08-15
Payer: COMMERCIAL

## 2023-08-15 VITALS
DIASTOLIC BLOOD PRESSURE: 82 MMHG | SYSTOLIC BLOOD PRESSURE: 122 MMHG | HEIGHT: 72 IN | TEMPERATURE: 97.3 F | BODY MASS INDEX: 27.63 KG/M2 | WEIGHT: 204 LBS | RESPIRATION RATE: 18 BRPM | HEART RATE: 61 BPM | OXYGEN SATURATION: 96 %

## 2023-08-15 DIAGNOSIS — R97.20 ELEVATED PSA: ICD-10-CM

## 2023-08-15 DIAGNOSIS — E11.9 CONTROLLED TYPE 2 DIABETES MELLITUS WITHOUT COMPLICATION, WITHOUT LONG-TERM CURRENT USE OF INSULIN (HCC): ICD-10-CM

## 2023-08-15 DIAGNOSIS — Z23 NEED FOR VACCINATION: ICD-10-CM

## 2023-08-15 LAB
HBA1C MFR BLD: 6.6 % (ref ?–5.8)
POCT INT CON NEG: NEGATIVE
POCT INT CON POS: POSITIVE

## 2023-08-15 PROCEDURE — 83036 HEMOGLOBIN GLYCOSYLATED A1C: CPT | Performed by: FAMILY MEDICINE

## 2023-08-15 PROCEDURE — 3074F SYST BP LT 130 MM HG: CPT | Performed by: FAMILY MEDICINE

## 2023-08-15 PROCEDURE — 99214 OFFICE O/P EST MOD 30 MIN: CPT | Performed by: FAMILY MEDICINE

## 2023-08-15 PROCEDURE — 3079F DIAST BP 80-89 MM HG: CPT | Performed by: FAMILY MEDICINE

## 2023-08-15 RX ORDER — CLOSTRIDIUM TETANI TOXOID ANTIGEN (FORMALDEHYDE INACTIVATED), CORYNEBACTERIUM DIPHTHERIAE TOXOID ANTIGEN (FORMALDEHYDE INACTIVATED), BORDETELLA PERTUSSIS TOXOID ANTIGEN (GLUTARALDEHYDE INACTIVATED), BORDETELLA PERTUSSIS FILAMENTOUS HEMAGGLUTININ ANTIGEN (FORMALDEHYDE INACTIVATED), BORDETELLA PERTUSSIS PERTACTIN ANTIGEN, AND BORDETELLA PERTUSSIS FIMBRIAE 2/3 ANTIGEN 5; 2; 2.5; 5; 3; 5 [LF]/.5ML; [LF]/.5ML; UG/.5ML; UG/.5ML; UG/.5ML; UG/.5ML
0.5 INJECTION, SUSPENSION INTRAMUSCULAR ONCE
Qty: 0.5 ML | Refills: 0 | Status: SHIPPED
Start: 2023-08-15 | End: 2023-08-15

## 2023-08-15 ASSESSMENT — FIBROSIS 4 INDEX: FIB4 SCORE: 0.82

## 2023-08-15 NOTE — ASSESSMENT & PLAN NOTE
A1c today in clinic is 6.6    A1c:   Lab Results   Component Value Date/Time    HBA1C 6.6 (H) 02/14/2023 0956    AVGLUC 143 02/14/2023 0956     Lipids:   Lab Results   Component Value Date/Time    CHOLSTRLTOT 193 02/14/2023 09:56 AM    TRIGLYCERIDE 153 (H) 02/14/2023 09:56 AM    HDL 51 02/14/2023 09:56 AM     (H) 02/14/2023 09:56 AM   ]  BMP:   Lab Results   Component Value Date/Time    SODIUM 136 08/12/2022 1101    POTASSIUM 4.5 08/12/2022 1101    CHLORIDE 100 08/12/2022 1101    CO2 25 08/12/2022 1101    GLUCOSE 107 (H) 08/12/2022 1101    BUN 13 08/12/2022 1101    CREATININE 0.90 08/12/2022 1101    CALCIUM 9.2 08/12/2022 1101    ANION 11.0 08/12/2022 1101     GFR:   Lab Results   Component Value Date/Time    IF[place] >60 02/10/2022 0722    IFNOTAFR >60 02/10/2022 0722     Last eye exam: up to date  Foot exam: normal sensation to the monofilament, no ulcers of feet  Has purple coloration of right great toe, normal cap refill.   Medications: met. Asa, statin

## 2023-08-15 NOTE — PROGRESS NOTES
Subjective:   Dylan Canchola is a 63 y.o. male here today for evaluation and management of:     Controlled type 2 diabetes mellitus without complication, without long-term current use of insulin ([facility]ly [facility])  A1c:   Lab Results   Component Value Date/Time    HBA1C 6.6 (H) 02/14/2023 0956    AVGLUC 143 02/14/2023 0956     Lipids:   Lab Results   Component Value Date/Time    CHOLSTRLTOT 193 02/14/2023 09:56 AM    TRIGLYCERIDE 153 (H) 02/14/2023 09:56 AM    HDL 51 02/14/2023 09:56 AM     (H) 02/14/2023 09:56 AM   ]  BMP:   Lab Results   Component Value Date/Time    SODIUM 136 08/12/2022 1101    POTASSIUM 4.5 08/12/2022 1101    CHLORIDE 100 08/12/2022 1101    CO2 25 08/12/2022 1101    GLUCOSE 107 (H) 08/12/2022 1101    BUN 13 08/12/2022 1101    CREATININE 0.90 08/12/2022 1101    CALCIUM 9.2 08/12/2022 1101    ANION 11.0 08/12/2022 1101     GFR:   Lab Results   Component Value Date/Time    IFAFR[place] >60 02/10/2022 0722    IFNOTAFR >60 02/10/2022 0722     Last eye exam: up to date  Foot exam: normal sensation to the monofilament, no ulcers of feet  Has purple coloration of right great toe, normal cap refill.   Medications: met. Asa, statin    Elevated PSA  Ref to urology provided  Mild elevated psa  Has no hematuria/obstructive urinary symptoms.      advised to get crc screening done.   Referral information provided.         Current medicines (including changes today)  Current Outpatient Medications   Medication Sig Dispense Refill    tetanus-dipth-acell pertussis (ADACEL) 5-2-15.5 LF-MCG/0.5 Suspension Inject 0.5 mL into the shoulder, thigh, or buttocks one time for 1 dose. 0.5 mL 0    Zoster Vac Recomb Adjuvanted (SHINGRIX) 50 MCG/0.5ML Recon Susp Inject 0.5 mL into the shoulder, thigh, or buttocks one time for 1 dose. 0.5 mL 0    metFORMIN (GLUCOPHAGE) 500 MG Tab TAKE 1 TABLET BY MOUTH THREE TIMES DAILY 270 Tablet 3    simvastatin (ZOCOR) 10 MG Tab Take 1 Tablet by mouth every evening. For high cholesterol 90 Tablet 3     "aspirin EC (ECOTRIN) 81 MG Tablet Delayed Response Take 81 mg by mouth every day.      WIXELA INHUB 250-50 MCG/DOSE AEROSOL POWDER, BREATH ACTIVATED 1 Puff every day.      fluticasone-salmeterol (ADVAIR) 250-50 MCG/ACT AEROSOL POWDER, BREATH ACTIVATED Inhale 1 Puff every 12 hours. (Patient not taking: Reported on 8/15/2023) 60 Each 11     No current facility-administered medications for this visit.     He  has no past medical history on file.    ROS  No chest pain, no shortness of breath, no abdominal pain       Objective:     /82 (BP Location: Left arm, Patient Position: Sitting, BP Cuff Size: Adult)   Pulse 61   Temp 36.3 °C (97.3 °F) (Temporal)   Resp 18   Ht 1.822 m (5' 11.75\")   Wt 92.5 kg (204 lb)   SpO2 96%  Body mass index is 27.86 kg/m².   Physical Exam:  Constitutional: Alert, no distress.  Skin: Warm, dry, good turgor, no rashes in visible areas.  Eye: Equal, round and reactive, conjunctiva clear, lids normal.  ENMT: Lips without lesions, good dentition, oropharynx clear.  Neck: Trachea midline, no masses, no thyromegaly. No cervical or supraclavicular lymphadenopathy  Respiratory: Unlabored respiratory effort, lungs clear to auscultation, no wheezes, no ronchi.  Cardiovascular: Normal S1, S2, no murmur, no edema.  Abdomen: Soft, non-tender, no masses, no hepatosplenomegaly.  Psych: Alert and oriented x3, normal affect and mood.        Assessment and Plan:   The following treatment plan was discussed    1. Need for vaccination  - tetanus-dipth-acell pertussis (ADACEL) 5-2-15.5 LF-MCG/0.5 Suspension; Inject 0.5 mL into the shoulder, thigh, or buttocks one time for 1 dose.  Dispense: 0.5 mL; Refill: 0  - Zoster Vac Recomb Adjuvanted (SHINGRIX) 50 MCG/0.5ML Recon Susp; Inject 0.5 mL into the shoulder, thigh, or buttocks one time for 1 dose.  Dispense: 0.5 mL; Refill: 0    2. Controlled type 2 diabetes mellitus without complication, without long-term current use of insulin (HCC)  - Comp Metabolic " Panel; Future  - Diabetic Monofilament LE Exam  - POCT A1C    3. Elevated PSA      Followup: Return in about 3 months (around 11/15/2023) for Lab Review, Diabetes, elevated psa.

## 2023-08-15 NOTE — PATIENT INSTRUCTIONS
Please get fasting labs, fasting for 8-10 hours before next visit. You can make an appointment for the lab or walk in.   Lab hours [facility] location: Monday to Friday 6 am - 4 pm, Saturday 7 am -noon  Even if you lose your lab paperwork, you can still come in to get your lab done.     Please check [employer] for your referral information or call the referral department at .   You can also send me a [employer] message regarding your referral information.   Please note you will probably not get a call regarding the referral.     [facility]  [address] [place] [id number]  [phone]

## 2023-08-24 ENCOUNTER — HOSPITAL ENCOUNTER (OUTPATIENT)
Facility: MEDICAL CENTER | Age: 63
End: 2023-08-24
Attending: UROLOGY
Payer: COMMERCIAL

## 2023-08-24 PROCEDURE — 84153 ASSAY OF PSA TOTAL: CPT

## 2023-08-25 LAB — PSA SERPL-MCNC: 3.26 NG/ML (ref 0–4)

## 2023-11-29 ENCOUNTER — OFFICE VISIT (OUTPATIENT)
Dept: MEDICAL GROUP | Facility: PHYSICIAN GROUP | Age: 63
End: 2023-11-29
Payer: COMMERCIAL

## 2023-11-29 VITALS
HEIGHT: 72 IN | OXYGEN SATURATION: 96 % | DIASTOLIC BLOOD PRESSURE: 62 MMHG | BODY MASS INDEX: 28.25 KG/M2 | RESPIRATION RATE: 14 BRPM | TEMPERATURE: 97.2 F | WEIGHT: 208.6 LBS | SYSTOLIC BLOOD PRESSURE: 118 MMHG | HEART RATE: 77 BPM

## 2023-11-29 DIAGNOSIS — J45.20 MILD INTERMITTENT ASTHMA WITHOUT COMPLICATION: ICD-10-CM

## 2023-11-29 DIAGNOSIS — Z12.11 SCREENING FOR COLORECTAL CANCER: ICD-10-CM

## 2023-11-29 DIAGNOSIS — Z11.4 ENCOUNTER FOR SCREENING FOR HIV: ICD-10-CM

## 2023-11-29 DIAGNOSIS — Z23 NEED FOR VACCINATION: ICD-10-CM

## 2023-11-29 DIAGNOSIS — E11.9 CONTROLLED TYPE 2 DIABETES MELLITUS WITHOUT COMPLICATION, WITHOUT LONG-TERM CURRENT USE OF INSULIN (HCC): ICD-10-CM

## 2023-11-29 DIAGNOSIS — Z12.12 SCREENING FOR COLORECTAL CANCER: ICD-10-CM

## 2023-11-29 PROCEDURE — 90686 IIV4 VACC NO PRSV 0.5 ML IM: CPT | Performed by: FAMILY MEDICINE

## 2023-11-29 PROCEDURE — 83036 HEMOGLOBIN GLYCOSYLATED A1C: CPT | Performed by: FAMILY MEDICINE

## 2023-11-29 PROCEDURE — 90472 IMMUNIZATION ADMIN EACH ADD: CPT | Performed by: FAMILY MEDICINE

## 2023-11-29 PROCEDURE — 3074F SYST BP LT 130 MM HG: CPT | Performed by: FAMILY MEDICINE

## 2023-11-29 PROCEDURE — 90471 IMMUNIZATION ADMIN: CPT | Performed by: FAMILY MEDICINE

## 2023-11-29 PROCEDURE — 99214 OFFICE O/P EST MOD 30 MIN: CPT | Mod: 25 | Performed by: FAMILY MEDICINE

## 2023-11-29 PROCEDURE — 90746 HEPB VACCINE 3 DOSE ADULT IM: CPT | Performed by: FAMILY MEDICINE

## 2023-11-29 PROCEDURE — 3078F DIAST BP <80 MM HG: CPT | Performed by: FAMILY MEDICINE

## 2023-11-29 ASSESSMENT — FIBROSIS 4 INDEX: FIB4 SCORE: 0.82

## 2023-11-29 NOTE — ASSESSMENT & PLAN NOTE
Uncontrolled worsening A1c from 6.6 increased to 7.1  He will get back to swimming.   A1c:   Lab Results   Component Value Date/Time    HBA1C 6.6 (A) 08/15/2023 0929    AVGLUC 143 02/14/2023 0956     Lipids:   Lab Results   Component Value Date/Time    CHOLSTRLTOT 193 02/14/2023 09:56 AM    TRIGLYCERIDE 153 (H) 02/14/2023 09:56 AM    HDL 51 02/14/2023 09:56 AM     (H) 02/14/2023 09:56 AM   ]  BMP:   Lab Results   Component Value Date/Time    SODIUM 136 08/12/2022 1101    POTASSIUM 4.5 08/12/2022 1101    CHLORIDE 100 08/12/2022 1101    CO2 25 08/12/2022 1101    GLUCOSE 107 (H) 08/12/2022 1101    BUN 13 08/12/2022 1101    CREATININE 0.90 08/12/2022 1101    CALCIUM 9.2 08/12/2022 1101    ANION 11.0 08/12/2022 1101     GFR:   Lab Results   Component Value Date/Time    IFAFR[place] >60 02/10/2022 0722    IFNOTAFR >60 02/10/2022 0722     Last eye exam: up to date  Foot exam: normal, up to date  Medications: metformin, simvastatin, asa 81

## 2023-11-29 NOTE — PROGRESS NOTES
Subjective:   Dylan Canchola is a 63 y.o. male here today for evaluation and management of:     Controlled type 2 diabetes mellitus without complication, without long-term current use of insulin (HCC)  Uncontrolled worsening A1c from 6.6 increased to 7.1  He will get back to swimming.   A1c:   Lab Results   Component Value Date/Time    HBA1C 6.6 (A) 08/15/2023 0929    AVGLUC 143 02/14/2023 0956     Lipids:   Lab Results   Component Value Date/Time    CHOLSTRLTOT 193 02/14/2023 09:56 AM    TRIGLYCERIDE 153 (H) 02/14/2023 09:56 AM    HDL 51 02/14/2023 09:56 AM     (H) 02/14/2023 09:56 AM   ]  BMP:   Lab Results   Component Value Date/Time    SODIUM 136 08/12/2022 1101    POTASSIUM 4.5 08/12/2022 1101    CHLORIDE 100 08/12/2022 1101    CO2 25 08/12/2022 1101    GLUCOSE 107 (H) 08/12/2022 1101    BUN 13 08/12/2022 1101    CREATININE 0.90 08/12/2022 1101    CALCIUM 9.2 08/12/2022 1101    ANION 11.0 08/12/2022 1101     GFR:   Lab Results   Component Value Date/Time    IFAFR[place] >60 02/10/2022 0722    IFNOTAFR >60 02/10/2022 0722     Last eye exam: up to date  Foot exam: normal, up to date  Medications: metformin, simvastatin, asa 81      Mild intermittent asthma without complication  Well controlled uses wixela as needed.              Current medicines (including changes today)  Current Outpatient Medications   Medication Sig Dispense Refill    metFORMIN (GLUCOPHAGE) 500 MG Tab TAKE 1 TABLET BY MOUTH THREE TIMES DAILY 270 Tablet 3    simvastatin (ZOCOR) 10 MG Tab Take 1 Tablet by mouth every evening. For high cholesterol 90 Tablet 3    aspirin EC (ECOTRIN) 81 MG Tablet Delayed Response Take 81 mg by mouth every day.      WIXELA INHUB 250-50 MCG/DOSE AEROSOL POWDER, BREATH ACTIVATED 1 Puff every day.       No current facility-administered medications for this visit.     He  has no past medical history on file.    ROS  No chest pain, no shortness of breath, no abdominal pain       Objective:     /62   Pulse 77   " Temp 36.2 °C (97.2 °F) (Temporal)   Resp 14   Ht 1.822 m (5' 11.75\")   Wt 94.6 kg (208 lb 9.6 oz)   SpO2 96%  Body mass index is 28.49 kg/m².   Physical Exam:  Constitutional: Alert, no distress, well-groomed.  Skin: No rashes in visible areas.  Eye: Round. Conjunctiva clear, lids normal. No icterus.   ENMT: Lips pink without lesions, good dentition, moist mucous membranes. Phonation normal.  Neck: No masses, no thyromegaly. Moves freely without pain.  Respiratory: Unlabored respiratory effort, no cough or audible wheeze  Psych: Alert and oriented x3, normal affect and mood.  .        Assessment and Plan:   The following treatment plan was discussed    1. Need for vaccination  - INFLUENZA VACCINE QUAD INJ (PF)  - Hepatitis B Vaccine Adult 20+    2. Controlled type 2 diabetes mellitus without complication, without long-term current use of insulin (HCC)  - HEMOGLOBIN A1C; Future  - Lipid Profile; Future  - Comp Metabolic Panel; Future  - MICROALBUMIN CREAT RATIO URINE; Future    3. Encounter for screening for HIV  - HIV AG/AB COMBO ASSAY SCREENING; Future    4. Screening for colorectal cancer  - Referral to GI for Colonoscopy    5. Mild intermittent asthma without complication      Followup: Return in about 3 months (around 2/29/2024) for Diabetes.           "

## 2023-11-30 LAB
HBA1C MFR BLD: 7.1 % (ref ?–5.8)
POCT INT CON NEG: NEGATIVE
POCT INT CON POS: POSITIVE

## 2024-03-20 ENCOUNTER — HOSPITAL ENCOUNTER (OUTPATIENT)
Facility: MEDICAL CENTER | Age: 64
End: 2024-03-20
Attending: FAMILY MEDICINE
Payer: COMMERCIAL

## 2024-03-20 ENCOUNTER — OFFICE VISIT (OUTPATIENT)
Dept: MEDICAL GROUP | Facility: PHYSICIAN GROUP | Age: 64
End: 2024-03-20
Payer: COMMERCIAL

## 2024-03-20 VITALS
TEMPERATURE: 97.8 F | SYSTOLIC BLOOD PRESSURE: 128 MMHG | HEIGHT: 72 IN | WEIGHT: 205 LBS | DIASTOLIC BLOOD PRESSURE: 82 MMHG | RESPIRATION RATE: 18 BRPM | OXYGEN SATURATION: 95 % | BODY MASS INDEX: 27.77 KG/M2 | HEART RATE: 79 BPM

## 2024-03-20 DIAGNOSIS — E11.9 CONTROLLED TYPE 2 DIABETES MELLITUS WITHOUT COMPLICATION, WITHOUT LONG-TERM CURRENT USE OF INSULIN (HCC): ICD-10-CM

## 2024-03-20 DIAGNOSIS — Z23 NEED FOR VACCINATION: ICD-10-CM

## 2024-03-20 DIAGNOSIS — E78.5 DYSLIPIDEMIA: ICD-10-CM

## 2024-03-20 LAB
CREAT UR-MCNC: 152.36 MG/DL
MICROALBUMIN UR-MCNC: <1.2 MG/DL
MICROALBUMIN/CREAT UR: NORMAL MG/G (ref 0–30)

## 2024-03-20 PROCEDURE — 3074F SYST BP LT 130 MM HG: CPT | Performed by: FAMILY MEDICINE

## 2024-03-20 PROCEDURE — 90746 HEPB VACCINE 3 DOSE ADULT IM: CPT | Performed by: FAMILY MEDICINE

## 2024-03-20 PROCEDURE — 3079F DIAST BP 80-89 MM HG: CPT | Performed by: FAMILY MEDICINE

## 2024-03-20 PROCEDURE — 99214 OFFICE O/P EST MOD 30 MIN: CPT | Mod: 25 | Performed by: FAMILY MEDICINE

## 2024-03-20 PROCEDURE — 90471 IMMUNIZATION ADMIN: CPT | Performed by: FAMILY MEDICINE

## 2024-03-20 PROCEDURE — 82043 UR ALBUMIN QUANTITATIVE: CPT

## 2024-03-20 PROCEDURE — 82570 ASSAY OF URINE CREATININE: CPT

## 2024-03-20 RX ORDER — FLUTICASONE PROPIONATE AND SALMETEROL 250; 50 UG/1; UG/1
1 POWDER RESPIRATORY (INHALATION) EVERY 12 HOURS
COMMUNITY
End: 2024-03-20

## 2024-03-20 RX ORDER — SIMVASTATIN 10 MG
10 TABLET ORAL NIGHTLY
COMMUNITY

## 2024-03-20 ASSESSMENT — PATIENT HEALTH QUESTIONNAIRE - PHQ9: CLINICAL INTERPRETATION OF PHQ2 SCORE: 0

## 2024-03-20 ASSESSMENT — FIBROSIS 4 INDEX: FIB4 SCORE: 0.82

## 2024-03-20 NOTE — PROGRESS NOTES
Subjective:   Dylan Canchola is a 63 y.o. male here today for evaluation and management of:     Dyslipidemia  The 10-year ASCVD risk score (Dylan SHANNON, et al., 2019) is: 17.9%  He smoked for 10 yrs quit in 1989  He has diabetes well controlled  Has rx for simvastatin 10 not been taking it. Will start this and recheck fasting labs.       Controlled type 2 diabetes mellitus without complication, without long-term current use of insulin (HCC)  A1c:   Lab Results   Component Value Date/Time    HBA1C 7.1 (A) 11/29/2023 0715    AVGLUC 143 02/14/2023 0956     Lipids:   Lab Results   Component Value Date/Time    CHOLSTRLTOT 193 02/14/2023 09:56 AM    TRIGLYCERIDE 153 (H) 02/14/2023 09:56 AM    HDL 51 02/14/2023 09:56 AM     (H) 02/14/2023 09:56 AM   ]  BMP:   Lab Results   Component Value Date/Time    SODIUM 136 08/12/2022 1101    POTASSIUM 4.5 08/12/2022 1101    CHLORIDE 100 08/12/2022 1101    CO2 25 08/12/2022 1101    GLUCOSE 107 (H) 08/12/2022 1101    BUN 13 08/12/2022 1101    CREATININE 0.90 08/12/2022 1101    CALCIUM 9.2 08/12/2022 1101    ANION 11.0 08/12/2022 1101     GFR:   Lab Results   Component Value Date/Time    IFAFR[place] >60 02/10/2022 0722    IFNOTAFR >60 02/10/2022 0722     Last eye exam: up to date  Foot exam: up to date  Medications: metformin, simvastatin, aspirin.                Current medicines (including changes today)  Current Outpatient Medications   Medication Sig Dispense Refill    simvastatin (ZOCOR) 10 MG Tab Take 10 mg by mouth every evening.      metFORMIN (GLUCOPHAGE) 500 MG Tab TAKE 1 TABLET BY MOUTH THREE TIMES DAILY 270 Tablet 3    aspirin EC (ECOTRIN) 81 MG Tablet Delayed Response Take 81 mg by mouth every day.      WIXELA INHUB 250-50 MCG/DOSE AEROSOL POWDER, BREATH ACTIVATED 1 Puff every day.       No current facility-administered medications for this visit.     He  has no past medical history on file.    ROS  No chest pain, no shortness of breath, no abdominal pain       Objective:  "    /82 (BP Location: Left arm, Patient Position: Sitting, BP Cuff Size: Adult)   Pulse 79   Temp 36.6 °C (97.8 °F) (Temporal)   Resp 18   Ht 1.822 m (5' 11.75\")   Wt 93 kg (205 lb)   SpO2 95%  Body mass index is 28 kg/m².   Physical Exam:  Constitutional: Alert, no distress.  Skin: Warm, dry, good turgor, no rashes in visible areas.  Eye: Equal, round and reactive, conjunctiva clear, lids normal.  ENMT: Lips without lesions, good dentition, oropharynx clear.  Neck: Trachea midline, no masses, no thyromegaly. No cervical or supraclavicular lymphadenopathy  Respiratory: Unlabored respiratory effort, lungs clear to auscultation, no wheezes, no ronchi.  Cardiovascular: Normal S1, S2, no murmur, no edema.  Abdomen: Soft, non-tender, no masses, no hepatosplenomegaly.  Psych: Alert and oriented x3, normal affect and mood.        Assessment and Plan:   The following treatment plan was discussed    1. Need for vaccination  - Hepatitis B Vaccine Adult 20+    2. Controlled type 2 diabetes mellitus without complication, without long-term current use of insulin (HCC)  - POCT Microalbumin Creat Ratio Urine; Future    3. Dyslipidemia    Other orders  - simvastatin (ZOCOR) 10 MG Tab; Take 10 mg by mouth every evening.      Followup: Return in about 6 months (around 9/20/2024) for Lab Review, Diabetes.           "

## 2024-03-20 NOTE — ASSESSMENT & PLAN NOTE
A1c:   Lab Results   Component Value Date/Time    HBA1C 7.1 (A) 11/29/2023 0715    AVGLUC 143 02/14/2023 0956     Lipids:   Lab Results   Component Value Date/Time    CHOLSTRLTOT 193 02/14/2023 09:56 AM    TRIGLYCERIDE 153 (H) 02/14/2023 09:56 AM    HDL 51 02/14/2023 09:56 AM     (H) 02/14/2023 09:56 AM   ]  BMP:   Lab Results   Component Value Date/Time    SODIUM 136 08/12/2022 1101    POTASSIUM 4.5 08/12/2022 1101    CHLORIDE 100 08/12/2022 1101    CO2 25 08/12/2022 1101    GLUCOSE 107 (H) 08/12/2022 1101    BUN 13 08/12/2022 1101    CREATININE 0.90 08/12/2022 1101    CALCIUM 9.2 08/12/2022 1101    ANION 11.0 08/12/2022 1101     GFR:   Lab Results   Component Value Date/Time    IFAFRICA >60 02/10/2022 0722    IFNOTAFR >60 02/10/2022 0722     Last eye exam: up to date  Foot exam: up to date  Medications: metformin, simvastatin, aspirin.

## 2024-03-20 NOTE — ASSESSMENT & PLAN NOTE
The 10-year ASCVD risk score (Dylan ORTEGA, et al., 2019) is: 17.9%  He smoked for 10 yrs quit in 1989  He has diabetes well controlled  Has rx for simvastatin 10 not been taking it. Will start this and recheck fasting labs.

## 2024-03-21 NOTE — RESULT ENCOUNTER NOTE
Lasah Canchola  Your urine microalbumin (protein) level is normal! Next is due in 1 year.   Dr. Miller

## 2024-03-28 ENCOUNTER — HOSPITAL ENCOUNTER (OUTPATIENT)
Dept: LAB | Facility: MEDICAL CENTER | Age: 64
End: 2024-03-28
Attending: UROLOGY
Payer: COMMERCIAL

## 2024-03-28 DIAGNOSIS — E11.9 CONTROLLED TYPE 2 DIABETES MELLITUS WITHOUT COMPLICATION, WITHOUT LONG-TERM CURRENT USE OF INSULIN (HCC): ICD-10-CM

## 2024-03-28 DIAGNOSIS — Z11.4 ENCOUNTER FOR SCREENING FOR HIV: ICD-10-CM

## 2024-03-28 LAB
ALBUMIN SERPL BCP-MCNC: 4.4 G/DL (ref 3.2–4.9)
ALBUMIN/GLOB SERPL: 1.7 G/DL
ALP SERPL-CCNC: 87 U/L (ref 30–99)
ALT SERPL-CCNC: 17 U/L (ref 2–50)
ANION GAP SERPL CALC-SCNC: 12 MMOL/L (ref 7–16)
AST SERPL-CCNC: 18 U/L (ref 12–45)
BILIRUB SERPL-MCNC: 0.4 MG/DL (ref 0.1–1.5)
BUN SERPL-MCNC: 13 MG/DL (ref 8–22)
CALCIUM ALBUM COR SERPL-MCNC: 8.7 MG/DL (ref 8.5–10.5)
CALCIUM SERPL-MCNC: 9 MG/DL (ref 8.5–10.5)
CHLORIDE SERPL-SCNC: 100 MMOL/L (ref 96–112)
CHOLEST SERPL-MCNC: 134 MG/DL (ref 100–199)
CO2 SERPL-SCNC: 25 MMOL/L (ref 20–33)
CREAT SERPL-MCNC: 0.87 MG/DL (ref 0.5–1.4)
CREAT UR-MCNC: 71.94 MG/DL
EST. AVERAGE GLUCOSE BLD GHB EST-MCNC: 154 MG/DL
FASTING STATUS PATIENT QL REPORTED: NORMAL
GFR SERPLBLD CREATININE-BSD FMLA CKD-EPI: 97 ML/MIN/1.73 M 2
GLOBULIN SER CALC-MCNC: 2.6 G/DL (ref 1.9–3.5)
GLUCOSE SERPL-MCNC: 167 MG/DL (ref 65–99)
HBA1C MFR BLD: 7 % (ref 4–5.6)
HDLC SERPL-MCNC: 43 MG/DL
HIV 1+2 AB+HIV1 P24 AG SERPL QL IA: NORMAL
LDLC SERPL CALC-MCNC: 50 MG/DL
MICROALBUMIN UR-MCNC: <1.2 MG/DL
MICROALBUMIN/CREAT UR: NORMAL MG/G (ref 0–30)
POTASSIUM SERPL-SCNC: 4.5 MMOL/L (ref 3.6–5.5)
PROT SERPL-MCNC: 7 G/DL (ref 6–8.2)
PSA SERPL-MCNC: 7.03 NG/ML (ref 0–4)
SODIUM SERPL-SCNC: 137 MMOL/L (ref 135–145)
TRIGL SERPL-MCNC: 205 MG/DL (ref 0–149)

## 2024-03-28 PROCEDURE — 87389 HIV-1 AG W/HIV-1&-2 AB AG IA: CPT

## 2024-03-28 PROCEDURE — 36415 COLL VENOUS BLD VENIPUNCTURE: CPT

## 2024-03-28 PROCEDURE — 84153 ASSAY OF PSA TOTAL: CPT

## 2024-03-28 PROCEDURE — 82043 UR ALBUMIN QUANTITATIVE: CPT

## 2024-03-28 PROCEDURE — 80053 COMPREHEN METABOLIC PANEL: CPT

## 2024-03-28 PROCEDURE — 82570 ASSAY OF URINE CREATININE: CPT

## 2024-03-28 PROCEDURE — 80061 LIPID PANEL: CPT

## 2024-03-28 PROCEDURE — 83036 HEMOGLOBIN GLYCOSYLATED A1C: CPT

## 2024-05-02 NOTE — TELEPHONE ENCOUNTER
Received request via: Pharmacy    Was the patient seen in the last year in this department? Yes    Does the patient have an active prescription (recently filled or refills available) for medication(s) requested? No    [facility]     Does the patient have long term Plus and need 100 day supply (blood pressure, diabetes and cholesterol meds only)? Patient does not have SCP  
normal (ped)...

## 2024-05-18 ENCOUNTER — HOSPITAL ENCOUNTER (OUTPATIENT)
Dept: RADIOLOGY | Facility: MEDICAL CENTER | Age: 64
End: 2024-05-18
Attending: UROLOGY
Payer: COMMERCIAL

## 2024-05-18 DIAGNOSIS — R97.20 ELEVATED PROSTATE SPECIFIC ANTIGEN (PSA): ICD-10-CM

## 2024-05-18 RX ADMIN — GLUCAGON 1 MG: 1 INJECTION, POWDER, LYOPHILIZED, FOR SOLUTION INTRAMUSCULAR; INTRAVENOUS at 07:50

## 2024-05-18 RX ADMIN — GADOTERIDOL 20 ML: 279.3 INJECTION, SOLUTION INTRAVENOUS at 08:26

## 2024-08-12 ENCOUNTER — HOSPITAL ENCOUNTER (OUTPATIENT)
Dept: LAB | Facility: MEDICAL CENTER | Age: 64
End: 2024-08-12
Attending: UROLOGY
Payer: COMMERCIAL

## 2024-08-12 LAB — PSA SERPL-MCNC: 6.89 NG/ML (ref 0–4)

## 2024-08-12 PROCEDURE — 36415 COLL VENOUS BLD VENIPUNCTURE: CPT

## 2024-08-12 PROCEDURE — 84153 ASSAY OF PSA TOTAL: CPT

## 2024-09-20 ENCOUNTER — OFFICE VISIT (OUTPATIENT)
Dept: MEDICAL GROUP | Facility: PHYSICIAN GROUP | Age: 64
End: 2024-09-20
Payer: COMMERCIAL

## 2024-09-20 VITALS
TEMPERATURE: 97.7 F | HEART RATE: 78 BPM | WEIGHT: 210 LBS | BODY MASS INDEX: 28.44 KG/M2 | SYSTOLIC BLOOD PRESSURE: 130 MMHG | HEIGHT: 72 IN | OXYGEN SATURATION: 98 % | RESPIRATION RATE: 16 BRPM | DIASTOLIC BLOOD PRESSURE: 78 MMHG

## 2024-09-20 DIAGNOSIS — E11.9 CONTROLLED TYPE 2 DIABETES MELLITUS WITHOUT COMPLICATION, WITHOUT LONG-TERM CURRENT USE OF INSULIN (HCC): ICD-10-CM

## 2024-09-20 DIAGNOSIS — E11.65 UNCONTROLLED TYPE 2 DIABETES MELLITUS WITH HYPERGLYCEMIA (HCC): ICD-10-CM

## 2024-09-20 DIAGNOSIS — F43.21 SITUATIONAL DEPRESSION: ICD-10-CM

## 2024-09-20 DIAGNOSIS — Z23 NEED FOR VACCINATION: ICD-10-CM

## 2024-09-20 LAB
HBA1C MFR BLD: 6.9 % (ref ?–5.8)
POCT INT CON NEG: NEGATIVE
POCT INT CON POS: POSITIVE

## 2024-09-20 PROCEDURE — 3075F SYST BP GE 130 - 139MM HG: CPT | Performed by: FAMILY MEDICINE

## 2024-09-20 PROCEDURE — 99214 OFFICE O/P EST MOD 30 MIN: CPT | Mod: 25 | Performed by: FAMILY MEDICINE

## 2024-09-20 PROCEDURE — 90471 IMMUNIZATION ADMIN: CPT

## 2024-09-20 PROCEDURE — 83036 HEMOGLOBIN GLYCOSYLATED A1C: CPT | Performed by: FAMILY MEDICINE

## 2024-09-20 PROCEDURE — 90746 HEPB VACCINE 3 DOSE ADULT IM: CPT

## 2024-09-20 PROCEDURE — 3078F DIAST BP <80 MM HG: CPT | Performed by: FAMILY MEDICINE

## 2024-09-20 RX ORDER — PROCHLORPERAZINE 25 MG/1
SUPPOSITORY RECTAL
Qty: 1 EACH | Refills: 3 | Status: SHIPPED | OUTPATIENT
Start: 2024-09-20

## 2024-09-20 RX ORDER — PROCHLORPERAZINE 25 MG/1
SUPPOSITORY RECTAL
Qty: 1 EACH | Refills: 0 | Status: SHIPPED | OUTPATIENT
Start: 2024-09-20

## 2024-09-20 RX ORDER — PROCHLORPERAZINE 25 MG/1
SUPPOSITORY RECTAL
Qty: 3 EACH | Refills: 11 | Status: SHIPPED | OUTPATIENT
Start: 2024-09-20

## 2024-09-20 NOTE — PROGRESS NOTES
Subjective:   Dylan Canchola III is a 64 y.o. male here today for evaluation and management of:     Controlled type 2 diabetes mellitus without complication, without long-term current use of insulin (HCC)  Very variable results with strips, pt has A1c >7, has occasional symptoms of high and low sugars. Also has to have a nap some days so needs to check for hypoglycemia.   On metformin 500 TID  I recommend continuous glucose monitoring to avoid hypoglycemic episodes.   Rx for dexcom provided.   Has been more active lately with archery    Situational depression  Pt feels he may have undiagnosed adhd/aspergers  Also has underlying depression and situational depression.          Current medicines (including changes today)  Current Outpatient Medications   Medication Sig Dispense Refill    Continuous Glucose  (DEXCOM G6 ) Device Use multiple times a day to check blood sugars: fasting, before and after meals and for symptoms of low and high blood sugars. 1 Each 0    Continuous Glucose Transmitter (DEXCOM G6 TRANSMITTER) Misc Use multiple times a day to check blood sugars: fasting, before and after meals and for symptoms of low and high blood sugars. 1 Each 3    Continuous Glucose Sensor (DEXCOM G6 SENSOR) Misc Use multiple times a day to check blood sugars: fasting, before and after meals and for symptoms of low and high blood sugars. 3 Each 11    metFORMIN (GLUCOPHAGE) 500 MG Tab Take 1 Tablet by mouth 3 times a day. 270 Tablet 3    simvastatin (ZOCOR) 10 MG Tab Take 10 mg by mouth every evening.      aspirin EC (ECOTRIN) 81 MG Tablet Delayed Response Take 81 mg by mouth every day.      WIXELA INHUB 250-50 MCG/DOSE AEROSOL POWDER, BREATH ACTIVATED 1 Puff every day.       No current facility-administered medications for this visit.     He  has no past medical history on file.    ROS  No chest pain, no shortness of breath, no abdominal pain       Objective:     /78   Pulse 78   Temp 36.5 °C  (97.7 °F) (Temporal)   Resp 16   Ht 1.829 m (6')   Wt 95.3 kg (210 lb)   SpO2 98%  Body mass index is 28.48 kg/m².   Physical Exam:  Constitutional: Alert, no distress.  Skin: Warm, dry, good turgor, no rashes in visible areas.  Eye: Equal, round and reactive, conjunctiva clear, lids normal.  ENMT: Lips without lesions, good dentition, oropharynx clear.  Neck: Trachea midline, no masses, no thyromegaly. No cervical or supraclavicular lymphadenopathy  Respiratory: Unlabored respiratory effort, lungs clear to auscultation, no wheezes, no ronchi.  Cardiovascular: Normal S1, S2, no murmur, no edema.  Abdomen: Soft, non-tender, no masses, no hepatosplenomegaly.  Psych: Alert and oriented x3, normal affect and mood.    Assessment and Plan:   The following treatment plan was discussed    1. Controlled type 2 diabetes mellitus without complication, without long-term current use of insulin (HCC)  - POCT Hemoglobin A1C  - Diabetic Monofilament LE Exam  - HEMOGLOBIN A1C; Future  - MICROALBUMIN CREAT RATIO URINE; Future  - Comp Metabolic Panel; Future  - Lipid Profile; Future    2. Uncontrolled type 2 diabetes mellitus with hyperglycemia (HCC)  - Continuous Glucose  (DEXCOM G6 ) Device; Use multiple times a day to check blood sugars: fasting, before and after meals and for symptoms of low and high blood sugars.  Dispense: 1 Each; Refill: 0  - Continuous Glucose Transmitter (DEXCOM G6 TRANSMITTER) Misc; Use multiple times a day to check blood sugars: fasting, before and after meals and for symptoms of low and high blood sugars.  Dispense: 1 Each; Refill: 3  - Continuous Glucose Sensor (DEXCOM G6 SENSOR) Misc; Use multiple times a day to check blood sugars: fasting, before and after meals and for symptoms of low and high blood sugars.  Dispense: 3 Each; Refill: 11    3. Situational depression  - Referral to Psychology    4. Need for vaccination  - Hepatitis B Vaccine Adult 20+      Followup: Return in about 6  months (around 3/20/2025) for Diabetes, Lab Review.

## 2024-09-20 NOTE — LETTER
[facility]  Susana Miller M.D.  [address] [place]  [place] [address]  Fax: [phone]   Authorization for Release/Disclosure of   Protected Health Information   Name: JEAN LUU III : 1960 SSN: [id number]   Address: [address] [place] [address]  [place] [address] Phone:    [phone] (home)    I authorize the entity listed below to release/disclose the PHI below to:   [facility]/Susana Miller M.D. and Susana Miller M.D.   Provider or Entity Name:      Address   City, State, Zip   Phone:      Fax:     Reason for request: continuity of care   Information to be released:    [  ] LAST COLONOSCOPY,  including any PATH REPORT and follow-up  [  ] LAST FIT/COLOGUARD RESULT [  ] LAST DEXA  [  ] LAST MAMMOGRAM  [  ] LAST PAP  [  ] LAST LABS [  ] RETINA EXAM REPORT  [  ] IMMUNIZATION RECORDS  [  ] Release all info      [  ] Check here and initial the line next to each item to release ALL health information INCLUDING  _____ Care and treatment for drug and / or alcohol abuse  _____ HIV testing, infection status, or AIDS  _____ Genetic Testing    DATES OF SERVICE OR TIME PERIOD TO BE DISCLOSED: _____________  I understand and acknowledge that:  * This Authorization may be revoked at any time by you in writing, except if your health information has already been used or disclosed.  * Your health information that will be used or disclosed as a result of you signing this authorization could be re-disclosed by the recipient. If this occurs, your re-disclosed health information may no longer be protected by State or Federal laws.  * You may refuse to sign this Authorization. Your refusal will not affect your ability to obtain treatment.  * This Authorization becomes effective upon signing and will  on (date) __________.      If no date is indicated, this Authorization will  one (1) year from the signature date.    Name: Jean Luu III  Signature: Date:   2024     PLEASE FAX  REQUESTED RECORDS BACK TO: [phone]

## 2024-09-20 NOTE — ASSESSMENT & PLAN NOTE
Pt feels he may have undiagnosed adhd/aspergers  Also has underlying depression and situational depression.

## 2024-09-20 NOTE — ASSESSMENT & PLAN NOTE
Very variable results with strips, pt has A1c >7, has occasional symptoms of high and low sugars. Also has to have a nap some days so needs to check for hypoglycemia.   A1c 6.9  On metformin 500 TID  I recommend continuous glucose monitoring to avoid hypoglycemic episodes.   Rx for dexcom provided.   Has been more active lately with archery  Normal sensation to monofilament testing b/l, no foot ulcers or deformities. Mild callus and dry skin. Decreased circulation.   Advised shoes that are not tight to avoid restricting circulation to toes.

## 2024-11-14 RX ORDER — LANCETS 30 GAUGE
EACH MISCELLANEOUS
Qty: 200 EACH | Refills: 3 | Status: SHIPPED | OUTPATIENT
Start: 2024-11-14

## 2024-11-14 RX ORDER — GLUCOSAMINE HCL/CHONDROITIN SU 500-400 MG
CAPSULE ORAL
Qty: 200 EACH | Refills: 3 | Status: SHIPPED | OUTPATIENT
Start: 2024-11-14

## 2024-12-19 ENCOUNTER — OFFICE VISIT (OUTPATIENT)
Dept: MEDICAL GROUP | Facility: PHYSICIAN GROUP | Age: 64
End: 2024-12-19
Payer: COMMERCIAL

## 2024-12-19 VITALS
BODY MASS INDEX: 28.84 KG/M2 | RESPIRATION RATE: 16 BRPM | WEIGHT: 206 LBS | HEART RATE: 92 BPM | OXYGEN SATURATION: 96 % | DIASTOLIC BLOOD PRESSURE: 78 MMHG | HEIGHT: 71 IN | TEMPERATURE: 98 F | SYSTOLIC BLOOD PRESSURE: 120 MMHG

## 2024-12-19 DIAGNOSIS — Z23 NEED FOR VACCINATION: ICD-10-CM

## 2024-12-19 DIAGNOSIS — Z12.11 SCREENING FOR COLORECTAL CANCER: ICD-10-CM

## 2024-12-19 DIAGNOSIS — R13.10 DYSPHAGIA, UNSPECIFIED TYPE: ICD-10-CM

## 2024-12-19 DIAGNOSIS — Z12.12 SCREENING FOR COLORECTAL CANCER: ICD-10-CM

## 2024-12-19 PROCEDURE — 3074F SYST BP LT 130 MM HG: CPT | Performed by: FAMILY MEDICINE

## 2024-12-19 PROCEDURE — 3078F DIAST BP <80 MM HG: CPT | Performed by: FAMILY MEDICINE

## 2024-12-19 PROCEDURE — 99214 OFFICE O/P EST MOD 30 MIN: CPT | Performed by: FAMILY MEDICINE

## 2024-12-19 RX ORDER — ALBUTEROL SULFATE 90 UG/1
2 INHALANT RESPIRATORY (INHALATION) EVERY 4 HOURS PRN
Qty: 1 EACH | Refills: 5 | Status: SHIPPED | OUTPATIENT
Start: 2024-12-19

## 2024-12-19 RX ORDER — CALCIUM CITRATE/VITAMIN D3 200MG-6.25
TABLET ORAL
COMMUNITY
Start: 2024-11-14

## 2024-12-19 RX ORDER — FLUTICASONE PROPIONATE AND SALMETEROL 250; 50 UG/1; UG/1
1 POWDER RESPIRATORY (INHALATION) EVERY 12 HOURS
Qty: 60 EACH | Refills: 5 | Status: SHIPPED | OUTPATIENT
Start: 2024-12-19

## 2024-12-21 NOTE — PROGRESS NOTES
Subjective:   Dylan Canchola III is a 64 y.o. male here today for evaluation and management of:     History of Present Illness  The patient presents for evaluation of dysphagia, prostate issues, depression, and breathing difficulties.    He reports experiencing intermittent dysphagia, characterized by food impaction in the mid-chest region. This is occasionally accompanied by pain during swallowing. There are instances where the food does not progress further, necessitating regurgitation to facilitate the passage of residual food. He speculates that this could be due to a hiatal hernia or another underlying condition, describing it as a pocket above the sphincter leading into the stomach where certain types of food, such as steak, bread, and tortillas, tend to get lodged. He does not experience similar issues with liquid intake.    His prostate condition is currently stable, with no alarming signs observed. The plan is to monitor the progression of the condition. His blood markers are within normal limits and do not raise any concerns.    He has been experiencing minor respiratory difficulties, which he attributes to the winter season. He has requested a refill of his Wixela inhaler, which he has exhausted. He does not possess an albuterol inhaler for use as needed. The Wixela inhaler has not been frequently required, except for periods of significant respiratory distress. He acknowledges a lack of physical activity, despite previous discussions about the benefits of swimming. He recalls that during his swimming regimen, he did not experience any respiratory or lower back issues.     He has initiated counseling sessions with Melody, which he reports as beneficial. He has identified what he believes to be low-level chronic depression and has been exploring potential treatments, including the use of cannabinoids. He has been experimenting with CBD gummies, taking half a gummy (12.5 mg of CBD mix) every 60 hours,  which he reports as beneficial. He has not observed any exacerbation of his pre-existing gastritis symptoms. He notes an improvement in his mood and motivation, as evidenced by his increased engagement in chores and resumption of writing and drawing activities. He has also been able to identify and interrupt self-reinforcing behavior loops. He expresses curiosity about the potential impact of discontinuing the CBD gummies for a week.    MEDICATIONS  Current: Wixela inhaler, CBD gummies    IMMUNIZATIONS  He received a flu shot at the pharmacy.          Current medicines (including changes today)  Current Outpatient Medications   Medication Sig Dispense Refill    TRUE METRIX BLOOD GLUCOSE TEST strip USE TO TEST BLOOD SUGAR TWICE DAILY      Blood Glucose Monitoring Suppl (TRUE METRIX METER) w/Device Kit USE TO TEST BLOOD SUGAR AS DIRECTED      fluticasone-salmeterol (ADVAIR) 250-50 MCG/ACT AEROSOL POWDER, BREATH ACTIVATED Inhale 1 Puff every 12 hours. 60 Each 5    albuterol 108 (90 Base) MCG/ACT Aero Soln inhalation aerosol Inhale 2 Puffs every four hours as needed for Shortness of Breath. 1 Each 5    Blood Glucose Meter Kit Test blood sugar as recommended by provider. blood glucose monitoring kit. 1 Kit 0    Blood Glucose Test Strips Use one strip to test blood sugar twice daily. 200 Strip 3    Lancets Use one lancet to test blood sugar twice daily. 200 Each 3    Alcohol Swabs Wipe site with prep pad prior to testing. 200 Each 3    metFORMIN (GLUCOPHAGE) 500 MG Tab Take 1 Tablet by mouth 3 times a day. 270 Tablet 3    simvastatin (ZOCOR) 10 MG Tab Take 10 mg by mouth every evening.      aspirin EC (ECOTRIN) 81 MG Tablet Delayed Response Take 81 mg by mouth every day.      WIXELA INHUB 250-50 MCG/DOSE AEROSOL POWDER, BREATH ACTIVATED 1 Puff every day.       No current facility-administered medications for this visit.     He  has no past medical history on file.    ROS  No chest pain, no shortness of breath, no  "abdominal pain       Objective:     /78 (BP Location: Left arm, Patient Position: Sitting, BP Cuff Size: Adult)   Pulse 92   Temp 36.7 °C (98 °F) (Temporal)   Resp 16   Ht 1.803 m (5' 11\")   Wt 93.4 kg (206 lb)   SpO2 96%  Body mass index is 28.73 kg/m².   Physical Exam:  Constitutional: Alert, no distress.  Skin: Warm, dry, good turgor, no rashes in visible areas.  Eye: Equal, round and reactive, conjunctiva clear, lids normal.  ENMT: Lips without lesions, good dentition, oropharynx clear.  Neck: Trachea midline, no masses, no thyromegaly. No cervical or supraclavicular lymphadenopathy  Respiratory: Unlabored respiratory effort, lungs clear to auscultation, no wheezes, no ronchi.  Cardiovascular: Normal S1, S2, no murmur, no edema.  Abdomen: Soft, non-tender, no masses, no hepatosplenomegaly.  Psych: Alert and oriented x3, normal affect and mood.       The following treatment plan was discussed  Assessment & Plan  1. Dysphagia.  He reports occasional difficulty with food sticking in the mid-chest area, particularly with certain foods like steak and tortillas. He is advised to take small bites and chew food thoroughly until the GI evaluation. A referral to Gastroenterology has been initiated for further evaluation and potential colonoscopy. If a stricture is identified, a mild dilation during the scope may be performed to alleviate symptoms.    2. Prostate issues.  His PSA levels have shown a decrease from 7.03 to 6.89, indicating a low likelihood of prostate cancer. MRI done by urology showed no concern for cancer. He is scheduled for a follow-up visit in 1 year for a repeat PSA test.    3. Depression.  He reports improvement in mood and activity levels with the use of CBD gummies (12.5 mg at roughly 60-hour intervals) and counseling sessions. He is advised to continue using CBD gummies cautiously, monitoring for any signs of gastritis, psychosis, or reality breaks. If these symptoms occur, he should " discontinue use and seek medical attention.    4. Respiratory difficulties.  He reports minor difficulty breathing, possibly seasonal. A prescription for Wixela inhaler and albuterol will be provided to manage symptoms as needed.    5. Health maintenance.  He is advised to receive his influenza vaccine at the pharmacy if it has not been administered this season.    1. Screening for colorectal cancer  - Referral to Gastroenterology    2. Dysphagia, unspecified type  - Referral to Gastroenterology    3. Need for vaccination    Other orders  - TRUE METRIX BLOOD GLUCOSE TEST strip; USE TO TEST BLOOD SUGAR TWICE DAILY  - Blood Glucose Monitoring Suppl (TRUE METRIX METER) w/Device Kit; USE TO TEST BLOOD SUGAR AS DIRECTED  - fluticasone-salmeterol (ADVAIR) 250-50 MCG/ACT AEROSOL POWDER, BREATH ACTIVATED; Inhale 1 Puff every 12 hours.  Dispense: 60 Each; Refill: 5  - albuterol 108 (90 Base) MCG/ACT Aero Soln inhalation aerosol; Inhale 2 Puffs every four hours as needed for Shortness of Breath.  Dispense: 1 Each; Refill: 5      Followup: Return for As Scheduled, Lab Review.  Verbal consent was acquired by the patient to use [employer] ambient listening note generation during this visit Yes

## 2025-03-21 ENCOUNTER — OFFICE VISIT (OUTPATIENT)
Dept: MEDICAL GROUP | Facility: PHYSICIAN GROUP | Age: 65
End: 2025-03-21
Payer: COMMERCIAL

## 2025-03-21 VITALS
RESPIRATION RATE: 16 BRPM | TEMPERATURE: 97.8 F | BODY MASS INDEX: 28.84 KG/M2 | SYSTOLIC BLOOD PRESSURE: 118 MMHG | WEIGHT: 206 LBS | DIASTOLIC BLOOD PRESSURE: 70 MMHG | HEIGHT: 71 IN | OXYGEN SATURATION: 97 % | HEART RATE: 96 BPM

## 2025-03-21 DIAGNOSIS — R97.20 ELEVATED PSA: ICD-10-CM

## 2025-03-21 DIAGNOSIS — Z23 NEED FOR VACCINATION: ICD-10-CM

## 2025-03-21 DIAGNOSIS — R13.19 OTHER DYSPHAGIA: ICD-10-CM

## 2025-03-21 PROCEDURE — 3078F DIAST BP <80 MM HG: CPT | Performed by: FAMILY MEDICINE

## 2025-03-21 PROCEDURE — 90656 IIV3 VACC NO PRSV 0.5 ML IM: CPT | Performed by: FAMILY MEDICINE

## 2025-03-21 PROCEDURE — 99213 OFFICE O/P EST LOW 20 MIN: CPT | Mod: 25 | Performed by: FAMILY MEDICINE

## 2025-03-21 PROCEDURE — 90471 IMMUNIZATION ADMIN: CPT | Performed by: FAMILY MEDICINE

## 2025-03-21 PROCEDURE — 3074F SYST BP LT 130 MM HG: CPT | Performed by: FAMILY MEDICINE

## 2025-03-21 ASSESSMENT — PATIENT HEALTH QUESTIONNAIRE - PHQ9
CLINICAL INTERPRETATION OF PHQ2 SCORE: 1
5. POOR APPETITE OR OVEREATING: 1 - SEVERAL DAYS
SUM OF ALL RESPONSES TO PHQ QUESTIONS 1-9: 3

## 2025-03-21 NOTE — PROGRESS NOTES
"Subjective:   Dylan Canchola III is a 64 y.o. male here today for evaluation and management of:     Other dysphagia  Pt is scheduled with GI for EGD and possibly colonoscopy.     Elevated PSA  Was evaluated by urology  Psa dec to 6.9  Had normal prostate MRI 2024 with no lesions.            Current medicines (including changes today)  Current Outpatient Medications   Medication Sig Dispense Refill    TRUE METRIX BLOOD GLUCOSE TEST strip USE TO TEST BLOOD SUGAR TWICE DAILY      Blood Glucose Monitoring Suppl (TRUE METRIX METER) w/Device Kit USE TO TEST BLOOD SUGAR AS DIRECTED      fluticasone-salmeterol (ADVAIR) 250-50 MCG/ACT AEROSOL POWDER, BREATH ACTIVATED Inhale 1 Puff every 12 hours. 60 Each 5    albuterol 108 (90 Base) MCG/ACT Aero Soln inhalation aerosol Inhale 2 Puffs every four hours as needed for Shortness of Breath. 1 Each 5    Blood Glucose Meter Kit Test blood sugar as recommended by provider. blood glucose monitoring kit. 1 Kit 0    Blood Glucose Test Strips Use one strip to test blood sugar twice daily. 200 Strip 3    Lancets Use one lancet to test blood sugar twice daily. 200 Each 3    Alcohol Swabs Wipe site with prep pad prior to testing. 200 Each 3    metFORMIN (GLUCOPHAGE) 500 MG Tab Take 1 Tablet by mouth 3 times a day. 270 Tablet 3    simvastatin (ZOCOR) 10 MG Tab Take 10 mg by mouth every evening.      aspirin EC (ECOTRIN) 81 MG Tablet Delayed Response Take 81 mg by mouth every day.      WIXELA INHUB 250-50 MCG/DOSE AEROSOL POWDER, BREATH ACTIVATED 1 Puff every day.       No current facility-administered medications for this visit.     He  has no past medical history on file.    ROS  No chest pain, no shortness of breath, no abdominal pain       Objective:     /70   Pulse 96   Temp 36.6 °C (97.8 °F) (Temporal)   Resp 16   Ht 1.803 m (5' 11\")   Wt 93.4 kg (206 lb)   SpO2 97%  Body mass index is 28.73 kg/m².   Physical Exam:  Constitutional: Alert, no distress.  Skin: Warm, dry, " good turgor, no rashes in visible areas.  Eye: Equal, round and reactive, conjunctiva clear, lids normal.  ENMT: Lips without lesions, good dentition, oropharynx clear.  Neck: Trachea midline, no masses, no thyromegaly. No cervical or supraclavicular lymphadenopathy  Respiratory: Unlabored respiratory effort, lungs clear to auscultation, no wheezes, no ronchi.  Cardiovascular: Normal S1, S2, no murmur, no edema.  Abdomen: Soft, non-tender, no masses, no hepatosplenomegaly.  Psych: Alert and oriented x3, normal affect and mood.    Assessment and Plan:   The following treatment plan was discussed    1. Other dysphagia    2. Need for vaccination  - INFLUENZA VACCINE TRI INJ (PF)     3. Elevated PSA      Followup: Return in about 6 months (around 9/21/2025) for Diabetes.

## 2025-06-30 NOTE — TELEPHONE ENCOUNTER
Received request via: Patient    Was the patient seen in the last year in this department? Yes    Does the patient have an active prescription (recently filled or refills available) for medication(s) requested? No    [facility]    Does the patient have nursing home Plus and need 100-day supply? (This applies to ALL medications) Patient does not have SCP   Finasteride Male Counseling: Finasteride Counseling:  I discussed with the patient the risks of use of finasteride including but not limited to decreased libido, decreased ejaculate volume, gynecomastia, and depression. Women should not handle medication.  All of the patient's questions and concerns were addressed. Finasteride Counseling:  I discussed with the patient the risks of use of finasteride including but not limited to decreased libido, decreased ejaculate volume, gynecomastia, and depression. Women should not handle medication.  All of the patient's questions and concerns were addressed.

## 2025-07-31 RX ORDER — ALBUTEROL SULFATE 90 UG/1
2 INHALANT RESPIRATORY (INHALATION) EVERY 4 HOURS PRN
Qty: 8.5 G | Refills: 11 | Status: SHIPPED | OUTPATIENT
Start: 2025-07-31

## 2025-07-31 NOTE — TELEPHONE ENCOUNTER
Received request via: Patient    Was the patient seen in the last year in this department? Yes    Does the patient have an active prescription (recently filled or refills available) for medication(s) requested? No    [facility]     Does the patient have alf Plus and need 100-day supply? (This applies to ALL medications) Patient does not have SCP